# Patient Record
Sex: FEMALE | Race: BLACK OR AFRICAN AMERICAN | Employment: PART TIME | URBAN - METROPOLITAN AREA
[De-identification: names, ages, dates, MRNs, and addresses within clinical notes are randomized per-mention and may not be internally consistent; named-entity substitution may affect disease eponyms.]

---

## 2017-03-13 ENCOUNTER — OFFICE VISIT (OUTPATIENT)
Dept: FAMILY MEDICINE CLINIC | Age: 52
End: 2017-03-13

## 2017-03-13 VITALS
RESPIRATION RATE: 18 BRPM | SYSTOLIC BLOOD PRESSURE: 124 MMHG | HEIGHT: 67 IN | DIASTOLIC BLOOD PRESSURE: 79 MMHG | WEIGHT: 160 LBS | BODY MASS INDEX: 25.11 KG/M2 | TEMPERATURE: 96.9 F | HEART RATE: 69 BPM

## 2017-03-13 DIAGNOSIS — R05.9 COUGH: Primary | ICD-10-CM

## 2017-03-13 DIAGNOSIS — J30.2 SEASONAL ALLERGIC RHINITIS, UNSPECIFIED ALLERGIC RHINITIS TRIGGER: ICD-10-CM

## 2017-03-13 DIAGNOSIS — M25.512 ACUTE PAIN OF LEFT SHOULDER: ICD-10-CM

## 2017-03-13 RX ORDER — CLOCORTOLONE PIVALATE 0 G/G
CREAM TOPICAL
Qty: 45 G | Refills: 2 | Status: SHIPPED | OUTPATIENT
Start: 2017-03-13 | End: 2018-02-20

## 2017-03-13 RX ORDER — NAPROXEN 500 MG/1
500 TABLET ORAL 2 TIMES DAILY WITH MEALS
Qty: 30 TAB | Refills: 0 | Status: SHIPPED | OUTPATIENT
Start: 2017-03-13 | End: 2018-12-12

## 2017-03-13 RX ORDER — LORATADINE AND PSEUDOEPHEDRINE 10; 240 MG/1; MG/1
TABLET, EXTENDED RELEASE ORAL
Qty: 30 TAB | Refills: 0 | Status: SHIPPED | OUTPATIENT
Start: 2017-03-13 | End: 2018-01-25 | Stop reason: SDUPTHER

## 2017-03-13 RX ORDER — AZITHROMYCIN 250 MG/1
TABLET, FILM COATED ORAL
Qty: 6 TAB | Refills: 0 | Status: SHIPPED | OUTPATIENT
Start: 2017-03-13 | End: 2017-04-27

## 2017-03-13 RX ORDER — BENZONATATE 100 MG/1
100 CAPSULE ORAL
Qty: 15 CAP | Refills: 0 | Status: SHIPPED | OUTPATIENT
Start: 2017-03-13 | End: 2017-03-18

## 2017-03-13 RX ORDER — FLUTICASONE PROPIONATE 50 MCG
1 SPRAY, SUSPENSION (ML) NASAL DAILY
Qty: 1 BOTTLE | Refills: 3 | Status: SHIPPED | OUTPATIENT
Start: 2017-03-13 | End: 2018-08-31 | Stop reason: SDUPTHER

## 2017-03-13 NOTE — PROGRESS NOTES
1. Have you been to the ER, urgent care clinic since your last visit? Hospitalized since your last visit? No    2. Have you seen or consulted any other health care providers outside of the [address] [place] [address] since your last visit? Include any pap smears or colon screening.  No

## 2017-03-13 NOTE — PATIENT INSTRUCTIONS
Shoulder Stretches: Exercises  Your Care Instructions  Here are some examples of exercises for your shoulder. Start each exercise slowly. Ease off the exercise if you start to have pain. Your doctor or physical therapist will tell you when you can start these exercises and which ones will work best for you. How to do the exercises  Note: These exercises should cause you to feel a gentle stretch, but no pain. Shoulder stretch    1.  a doorway and place one arm against the door frame. Your elbow should be a little higher than your shoulder. 2. Relax your shoulders as you lean forward, allowing your chest and shoulder muscles to stretch. You can also turn your body slightly away from your arm to stretch the muscles even more. 3. Hold for 15 to 30 seconds. 4. Repeat 2 to 4 times with each arm. Shoulder and chest stretch    Shoulder and chest stretch  1. While sitting, relax your upper body so you slump slightly in your chair. 2. As you breathe in, straighten your back and open your arms out to the sides. 3. Gently pull your shoulder blades back and downward. 4. Hold for 15 to 30 seconds as your breathe normally. 5. Repeat 2 to 4 times. Overhead stretch    1. Reach up over your head with both arms. 2. Hold for 15 to 30 seconds. 3. Repeat 2 to 4 times. Follow-up care is a key part of your treatment and safety. Be sure to make and go to all appointments, and call your doctor if you are having problems. It's also a good idea to know your test results and keep a list of the medicines you take. Where can you learn more? Go to http://dolores-alhaji.info/. Enter S254 in the search box to learn more about \"Shoulder Stretches: Exercises. \"  Current as of: May 23, 2016  Content Version: 11.1  © 8606-0942 [employer]. Care instructions adapted under license by [employer] (which disclaims liability or warranty for this information).  If you have questions about a medical condition or this instruction, always ask your healthcare professional. [address] any warranty or liability for your use of this information. Shoulder Pain: Care Instructions  Your Care Instructions    You can hurt your shoulder by using it too much during an activity, such as fishing or baseball. It can also happen as part of the everyday wear and tear of getting older. Shoulder injuries can be slow to heal, but your shoulder should get better with time. Your doctor may recommend a sling to rest your shoulder. If you have injured your shoulder, you may need testing and treatment. Follow-up care is a key part of your treatment and safety. Be sure to make and go to all appointments, and call your doctor if you are having problems. It's also a good idea to know your test results and keep a list of the medicines you take. How can you care for yourself at home? · Take pain medicines exactly as directed. ¨ If the doctor gave you a prescription medicine for pain, take it as prescribed. ¨ If you are not taking a prescription pain medicine, ask your doctor if you can take an over-the-counter medicine. ¨ Do not take two or more pain medicines at the same time unless the doctor told you to. Many pain medicines contain acetaminophen, which is Tylenol. Too much acetaminophen (Tylenol) can be harmful. · If your doctor recommends that you wear a sling, use it as directed. Do not take it off before your doctor tells you to. · Put ice or a cold pack on the sore area for 10 to 20 minutes at a time. Put a thin cloth between the ice and your skin. · If there is no swelling, you can put moist heat, a heating pad, or a warm cloth on your shoulder. Some doctors suggest alternating between hot and cold. · Rest your shoulder for a few days. If your doctor recommends it, you can then begin gentle exercise of the shoulder, but do not lift anything heavy. When should you call for help?   Call 911 anytime you think you may need emergency care. For example, call if:  · You have chest pain or pressure. This may occur with:  ¨ Sweating. ¨ Shortness of breath. ¨ Nausea or vomiting. ¨ Pain that spreads from the chest to the neck, jaw, or one or both shoulders or arms. ¨ Dizziness or lightheadedness. ¨ A fast or uneven pulse. After calling 911, chew 1 adult-strength aspirin. Wait for an ambulance. Do not try to drive yourself. · Your arm or hand is cool or pale or changes color. Call your doctor now or seek immediate medical care if:  · You have signs of infection, such as:  ¨ Increased pain, swelling, warmth, or redness in your shoulder. ¨ Red streaks leading from a place on your shoulder. ¨ Pus draining from an area of your shoulder. ¨ Swollen lymph nodes in your neck, armpits, or groin. ¨ A fever. Watch closely for changes in your health, and be sure to contact your doctor if:  · You cannot use your shoulder. · Your shoulder does not get better as expected. Where can you learn more? Go to http://doloresCascade Financial Technology Corpalhaji.info/. Enter W613 in the search box to learn more about \"Shoulder Pain: Care Instructions. \"  Current as of: May 23, 2016  Content Version: 11.1  © 6575-6595 [employer]. Care instructions adapted under license by [employer] (which disclaims liability or warranty for this information). If you have questions about a medical condition or this instruction, always ask your healthcare professional. [place] [address] any warranty or liability for your use of this information. Cough: Care Instructions  Your Care Instructions  A cough is your body's response to something that bothers your throat or airways. Many things can cause a cough. You might cough because of a cold or the flu, bronchitis, or asthma. Smoking, postnasal drip, allergies, and stomach acid that backs up into your throat also can cause coughs.   A cough is a symptom, not a disease. Most coughs stop when the cause, such as a cold, goes away. You can take a few steps at home to cough less and feel better. Follow-up care is a key part of your treatment and safety. Be sure to make and go to all appointments, and call your doctor if you are having problems. It's also a good idea to know your test results and keep a list of the medicines you take. How can you care for yourself at home? · Drink lots of water and other fluids. This helps thin the mucus and soothes a dry or sore throat. Honey or lemon juice in hot water or tea may ease a dry cough. · Take cough medicine as directed by your doctor. · Prop up your head on pillows to help you breathe and ease a dry cough. · Try cough drops to soothe a dry or sore throat. Cough drops don't stop a cough. Medicine-flavored cough drops are no better than candy-flavored drops or hard candy. · Do not smoke. Avoid secondhand smoke. If you need help quitting, talk to your doctor about stop-smoking programs and medicines. These can increase your chances of quitting for good. When should you call for help? Call 911 anytime you think you may need emergency care. For example, call if:  · You have severe trouble breathing. Call your doctor now or seek immediate medical care if:  · You cough up blood. · You have new or worse trouble breathing. · You have a new or higher fever. · You have a new rash. Watch closely for changes in your health, and be sure to contact your doctor if:  · You cough more deeply or more often, especially if you notice more mucus or a change in the color of your mucus. · You have new symptoms, such as a sore throat, an earache, or sinus pain. · You do not get better as expected. Where can you learn more? Go to http://dolores-alhaji.info/. Enter D279 in the search box to learn more about \"Cough: Care Instructions. \"  Current as of: May 27, 2016  Content Version: 11.1  © 7316-4695 [employer]. Care instructions adapted under license by [employer]â„¢ (which disclaims liability or warranty for this information). If you have questions about a medical condition or this instruction, always ask your healthcare professional. [address] any warranty or liability for your use of this information. Managing Your Allergies: Care Instructions  Your Care Instructions  Managing your allergies is an important part of staying healthy. Your doctor will help you find out what may be causing the allergies. Common causes of allergy symptoms are house dust and dust mites, animal dander, mold, and pollen. As soon as you know what triggers your symptoms, try to reduce your exposure to your triggers. This can help prevent allergy symptoms, asthma, and other health problems. Ask your doctor about allergy medicine or immunotherapy. These treatments may help reduce or prevent allergy symptoms. Follow-up care is a key part of your treatment and safety. Be sure to make and go to all appointments, and call your doctor if you are having problems. It's also a good idea to know your test results and keep a list of the medicines you take. How can you care for yourself at home? · If you think that dust or dust mites are causing your allergies:  ¨ Wash sheets, pillowcases, and other bedding every week in hot water. ¨ Use airtight, dust-proof covers for pillows, duvets, and mattresses. Avoid plastic covers, because they tend to tear quickly and do not \"breathe. \" Wash according to the instructions. ¨ Remove extra blankets and pillows that you don't need. ¨ Use blankets that are machine-washable. ¨ Don't use home humidifiers. They can help mites live longer. · Use air-conditioning. Change or clean all filters every month. Keep windows closed. Use high-efficiency air filters. Don't use window or attic fans, which draw dust into the air.   · If you're allergic to pet dander, keep pets outside or, at the very least, out of your bedroom. Old carpet and cloth-covered furniture can hold a lot of animal dander. You may need to replace them. · Look for signs of cockroaches. Use cockroach baits to get rid of them. Then clean your home well. · If you're allergic to mold, don't keep indoor plants, because molds can grow in soil. Get rid of furniture, rugs, and drapes that smell musty. Check for mold in the bathroom. · If you're allergic to pollen, stay inside when pollen counts are high. · Don't smoke or let anyone else smoke in your house. Don't use fireplaces or wood-burning stoves. Avoid paint fumes, perfumes, and other strong odors. When should you call for help? Give an epinephrine shot if:  · You think you are having a severe allergic reaction. · You have symptoms in more than one body area, such as mild nausea and an itchy mouth. After giving an epinephrine shot call 911, even if you feel better. Call 911 if:  · You have symptoms of a severe allergic reaction. These may include:  ¨ Sudden raised, red areas (hives) all over your body. ¨ Swelling of the throat, mouth, lips, or tongue. ¨ Trouble breathing. ¨ Passing out (losing consciousness). Or you may feel very lightheaded or suddenly feel weak, confused, or restless. · You have been given an epinephrine shot, even if you feel better. Call your doctor now or seek immediate medical care if:  · You have symptoms of an allergic reaction, such as:  ¨ A rash or hives (raised, red areas on the skin). ¨ Itching. ¨ Swelling. ¨ Belly pain, nausea, or vomiting. Watch closely for changes in your health, and be sure to contact your doctor if:  · Your allergies get worse. · You need help controlling your allergies. · You have questions about allergy testing. · You do not get better as expected. Where can you learn more? Go to http://dolores-alhaji.info/.   Enter L249 in the search box to learn more about \"Managing Your Allergies: Care Instructions. \"  Current as of: February 12, 2016  Content Version: 11.1  © 4428-0785 [employer]. Care instructions adapted under license by [employer] (which disclaims liability or warranty for this information). If you have questions about a medical condition or this instruction, always ask your healthcare professional. [address] any warranty or liability for your use of this information.

## 2017-03-13 NOTE — PROGRESS NOTES
Chief Complaint   Patient presents with    Cold Symptoms     Requesting a z jusitna    Shoulder Pain     left x 2 days           HPI:     This is a 47year old patient who is here with the above complaints. Complains of cold symptoms x 1 week. Denies fever or chills or generalized body aches. Denies sore throat, ear pain, headache, SOB, CP. States she wants to get rid of her non productive cough. Denies being a smoker. Also complains of mild left shoulder pain since yesterday. Took Ibuprofen OTC and now a little bit better. She works in the [employer] and carries heavy stuff. She is left-handed. Denies hearing or feeling that something popped on her left shoulder. History reviewed. No pertinent past medical history. Social History   Substance Use Topics    Smoking status: Current Some Day Smoker     Types: Cigarettes    Smokeless tobacco: None    Alcohol use Yes     Outpatient Encounter Prescriptions as of 3/13/2017   Medication Sig Dispense Refill    CLARITIN-D 24 HOUR  mg per tablet TAKE 1 TAB BY MOUTH DAILYAS NEEDED. 90 Tab 1    ergocalciferol (ERGOCALCIFEROL) 50,000 unit capsule TAKE 1 CAPSULE BY MOUTH EVERY 7 DAYS 12 Cap 1    dimethicone (MEDERMA PM) 2 % topical cream Apply  to affected area as needed for Other. 48 g 0    clocortolone pivalate (CLODERM) 0.1 % topical cream APPLY TO AFFECTED AREA THREE (3) TIMES DAILY AS NEEDED FOR SKIN IRRITATION OR ITCHING. 45 g 0    diclofenac (VOLTAREN) 1 % gel Apply  to affected area four (4) times daily. To affected knee 4 g 4    Clindamycin-Benzoyl Peroxide 1.2-2.5 % gel Apply to dry clean skin BID 50 g 0    Arm Brace (NEOPRENE WRIST SPLINT SUPPORT) misc Use as needed. 1 Each 0    fluticasone (FLONASE) 50 mcg/actuation nasal spray 1 [place] by Both Nostrils route daily. 1 Bottle 6     No facility-administered encounter medications on file as of 3/13/2017. ROS:    Negative except that mentioned in HPI.     Physical Exam:    Vital Signs: Visit Vitals    /79    Pulse 69    Temp 96.9 °F (36.1 °C) (Oral)    Resp 18    Ht 5' 7\" (1.702 m)    Wt 160 lb (72.6 kg)    BMI 25.06 kg/m2         Constitutional: a, a & o x 3, vital signs stable, non ill-appearing, no acute distress, interacting appropriately  HEENT: Head normocephalic, atraumatic, no conjuctival pallor or erythema, PERRLA, EOMI, ear canals clear with no erythema or excessive cerumen, TM intact and non-bulging, nose clear, nasal turbinates mildly erythematous, no sinus tenderness, pharynx and tonsils with no erythema, no exudates, no tonsillar swelling   Respiratory: symmetrical chest expansion, lungs clear to auscultation bilaterally, good air entry, normal respiratory effort, no wheezes or crackles  CV: capillary refill < 2 sec, normal S1S2, regular rate and rhythm, no murmurs, no carotid bruits, no JVD, pulses palpable, no thrills  Skin: no pallor, warm and dry, no rashes, no bruises,  Lymph: no cervical lymphadenopathy  Musculoskeletal: ROM on left shoulder with no limitation, no swelling, no deformity        Assessment/Plan:    1. Cough    - discussed risk of antibiotic- resistant infection with antibiotic use when not indicated  - Discussed etiology of cough - allergy , viral , bacterial, medication-related, GERD-related  - patient requests for antibiotic . Prescribed antimicrobial at a later date. May fill prescription if not getting any better after 3 days. - azithromycin (ZITHROMAX) 250 mg tablet; Take 2 tablets today, then take 1 tablet daily  Dispense: 6 Tab; Refill: 0 - post-dated  - benzonatate (TESSALON) 100 mg capsule; Take 1 Cap by mouth three (3) times daily as needed for Cough for up to 5 days. Dispense: 15 Cap; Refill: 0      2. Seasonal allergic rhinitis, unspecified allergic rhinitis trigger    - fluticasone (FLONASE) 50 mcg/actuation nasal spray; 1 [place] by Both Nostrils route daily. Indications: ALLERGIC RHINITIS  Dispense: 1 Bottle;  Refill: 3  - loratadine-pseudoephedrine (CLARITIN-D 24 HOUR)  mg per tablet; TAKE 1 TAB BY MOUTH DAILYAS NEEDED. Dispense: 30 Tab; Refill: 0  - environment control    3. Acute pain of left shoulder    - naproxen (NAPROSYN) 500 mg tablet; Take 1 Tab by mouth two (2) times daily (with meals). Indications: Pain  Dispense: 30 Tab; Refill: 0  - Shoulder exercises discussed. Printed copy provided. - avoid overuse of left shoulder      Additional Notes: Discussed today's diagnosis and treatment plans. Medication indications and adverse effects discussed. After Visit Summary: Provided and discussed printed patient instructions. Questions answered. Follow-up Disposition:  Return in about 1 month (around 4/13/2017) for follow-up with Dr. Giovanna Cai for CPE. Dionisio Hatfield, ANP-BC  Adult Medicine  [facility]

## 2017-03-13 NOTE — MR AVS SNAPSHOT
Visit Information Date & Time Provider Department Dept. Phone Encounter #  
 3/13/2017 10:30 AM Ashley Delacruz, [address] [id number] Follow-up Instructions Return in about 1 month (around 4/13/2017) for follow-up with Dr. Kaila Ochoa for CPE. Upcoming Health Maintenance Date Due COLONOSCOPY 1/11/1984 Pneumococcal 19-64 Medium Risk (1 of 1 - PPSV23) 1/11/1985 INFLUENZA AGE 9 TO ADULT 8/1/2016 BREAST CANCER SCRN MAMMOGRAM 6/13/2018 PAP AKA CERVICAL CYTOLOGY 4/6/2019 DTaP/Tdap/Td series (2 - Td) 3/3/2025 Allergies as of 3/13/2017  Review Complete On: 3/13/2017 By: KAMALA Headley No Known Allergies Current Immunizations  Reviewed on 3/13/2017 Name Date Tdap 3/3/2015 Reviewed by KAMALA Headley on 3/13/2017 at 10:30 AM  
You Were Diagnosed With   
  
 Codes Comments Seasonal allergic rhinitis, unspecified allergic rhinitis trigger    -  Primary ICD-10-CM: J30.2 ICD-9-CM: 477.9 Acute pain of left shoulder     ICD-10-CM: M25.512 ICD-9-CM: 719.41 Cough     ICD-10-CM: R05 ICD-9-CM: 356. 2 Vitals BP Pulse Temp Resp Height(growth percentile) Weight(growth percentile) 124/79 69 96.9 °F (36.1 °C) (Oral) 18 5' 7\" (1.702 m) 160 lb (72.6 kg) BMI OB Status Smoking Status 25.06 kg/m2 Hysterectomy Current Some Day Smoker Vitals History BMI and BSA Data Body Mass Index Body Surface Area 25.06 kg/m 2 1.85 m 2 Preferred Pharmacy Pharmacy Name Phone [facility], [address] [place] [address] [phone] Your Updated Medication List  
  
   
This list is accurate as of: 3/13/17 10:54 AM.  Always use your most recent med list.  
  
  
  
  
 Arm Brace Misc Commonly known as:  NEOPRENE WRIST SPLINT SUPPORT Use as needed. azithromycin 250 mg tablet Commonly known as:  Bennetta Plum Take 2 tablets today, then take 1 tablet daily  
  
 benzonatate 100 mg capsule Commonly known as:  TESSALON Take 1 Cap by mouth three (3) times daily as needed for Cough for up to 5 days. Clindamycin-Benzoyl Peroxide 1.2-2.5 % Gel Apply to dry clean skin BID  
  
 clocortolone pivalate 0.1 % topical cream  
Commonly known as:  CLODERM  
APPLY TO AFFECTED AREA THREE (3) TIMES DAILY AS NEEDED FOR SKIN IRRITATION OR ITCHING. diclofenac 1 % Gel Commonly known as:  VOLTAREN Apply  to affected area four (4) times daily. To affected knee  
  
 dimethicone 2 % topical cream  
Commonly known as:  [facility]  
Apply  to affected area as needed for Other.  
  
 ergocalciferol 50,000 unit capsule Commonly known as:  ERGOCALCIFEROL  
TAKE 1 CAPSULE BY MOUTH EVERY 7 DAYS  
  
 fluticasone 50 mcg/actuation nasal spray Commonly known as:  FLONASE  
1 [place] by Both Nostrils route daily. Indications: ALLERGIC RHINITIS  
  
 loratadine-pseudoephedrine  mg per tablet Commonly known as:  CLARITIN-D 24 HOUR  
TAKE 1 TAB BY MOUTH DAILYAS NEEDED. naproxen 500 mg tablet Commonly known as:  NAPROSYN Take 1 Tab by mouth two (2) times daily (with meals). Indications: Pain Prescriptions Sent to Pharmacy Refills  
 fluticasone (FLONASE) 50 mcg/actuation nasal spray 3 [place] by Both Nostrils route daily. Indications: ALLERGIC RHINITIS Class: Normal  
 [facility] [address] Ph #: [phone] Route: Both Nostrils  
 loratadine-pseudoephedrine (CLARITIN-D 24 HOUR)  mg per tablet 0 Sig: TAKE 1 TAB BY MOUTH DAILYAS NEEDED. Class: Normal  
 Pharm[facility]: [facility] [address] [facility] Ph #: [phone]  
 clocortolone pivalate (CLODERM) 0.1 % topical cream 2 Sig: APPLY TO AFFECTED AREA THREE (3) TIMES DAILY AS NEEDED FOR SKIN IRRITATION OR ITCHING.   
 Class: Normal  
 Pharmacy: [facility] Ph #: [phone]  
 azithromycin (ZITHROMAX) 250 mg tablet 0 Sig: Take 2 tablets today, then take 1 tablet daily Class: Normal  
 Pharmacy: [facility] [address] [facility] Ph #: [phone]  
 benzonatate (TESSALON) 100 mg capsule 0 Sig: Take 1 Cap by mouth three (3) times daily as needed for Cough for up to 5 days. Class: Normal  
 Pharmacy: [facility] [address] Ph #: [phone] Route: Oral  
 naproxen (NAPROSYN) 500 mg tablet 0 Sig: Take 1 Tab by mouth two (2) times daily (with meals). Indications: Pain Class: Normal  
 Pharmacy: [facility] [address] Ph #: [phone] Route: Oral  
  
Follow-up Instructions Return in about 1 month (around 4/13/2017) for follow-up with Dr. Jim Taylor for CPE. Patient Instructions Shoulder Stretches: Exercises Your Care Instructions Here are some examples of exercises for your shoulder. Start each exercise slowly. Ease off the exercise if you start to have pain. Your doctor or physical therapist will tell you when you can start these exercises and which ones will work best for you. How to do the exercises Note: These exercises should cause you to feel a gentle stretch, but no pain. Shoulder stretch 1.  a doorway and place one arm against the door frame. Your elbow should be a little higher than your shoulder. 2. Relax your shoulders as you lean forward, allowing your chest and shoulder muscles to stretch. You can also turn your body slightly away from your arm to stretch the muscles even more. 3. Hold for 15 to 30 seconds. 4. Repeat 2 to 4 times with each arm. Shoulder and chest stretch Shoulder and chest stretch 1. While sitting, relax your upper body so you slump slightly in your chair.  
2. As you breathe in, straighten your back and open your arms out to the sides. 
3. Gently pull your shoulder blades back and downward. 4. Hold for 15 to 30 seconds as your breathe normally. 5. Repeat 2 to 4 times. Overhead stretch 1. Reach up over your head with both arms. 2. Hold for 15 to 30 seconds. 3. Repeat 2 to 4 times. Follow-up care is a key part of your treatment and safety. Be sure to make and go to all appointments, and call your doctor if you are having problems. It's also a good idea to know your test results and keep a list of the medicines you take. Where can you learn more? Go to http://dolores-alhaji.info/. Enter S254 in the search box to learn more about \"Shoulder Stretches: Exercises. \" Current as of: May 23, 2016 Content Version: 11.1 © 9056-0105 [employer]. Care instructions adapted under license by [employer] (which disclaims liability or warranty for this information). If you have questions about a medical condition or this instruction, always ask your healthcare professional. [place] [address] any warranty or liability for your use of this information. Shoulder Pain: Care Instructions Your Care Instructions You can hurt your shoulder by using it too much during an activity, such as fishing or baseball. It can also happen as part of the everyday wear and tear of getting older. Shoulder injuries can be slow to heal, but your shoulder should get better with time. Your doctor may recommend a sling to rest your shoulder. If you have injured your shoulder, you may need testing and treatment. Follow-up care is a key part of your treatment and safety. Be sure to make and go to all appointments, and call your doctor if you are having problems. It's also a good idea to know your test results and keep a list of the medicines you take. How can you care for yourself at home? · Take pain medicines exactly as directed.  
¨ If the doctor gave you a prescription medicine for pain, take it as prescribed. ¨ If you are not taking a prescription pain medicine, ask your doctor if you can take an over-the-counter medicine. ¨ Do not take two or more pain medicines at the same time unless the doctor told you to. Many pain medicines contain acetaminophen, which is Tylenol. Too much acetaminophen (Tylenol) can be harmful. · If your doctor recommends that you wear a sling, use it as directed. Do not take it off before your doctor tells you to. · Put ice or a cold pack on the sore area for 10 to 20 minutes at a time. Put a thin cloth between the ice and your skin. · If there is no swelling, you can put moist heat, a heating pad, or a warm cloth on your shoulder. Some doctors suggest alternating between hot and cold. · Rest your shoulder for a few days. If your doctor recommends it, you can then begin gentle exercise of the shoulder, but do not lift anything heavy. When should you call for help? Call 911 anytime you think you may need emergency care. For example, call if: 
· You have chest pain or pressure. This may occur with: ¨ Sweating. ¨ Shortness of breath. ¨ Nausea or vomiting. ¨ Pain that spreads from the chest to the neck, jaw, or one or both shoulders or arms. ¨ Dizziness or lightheadedness. ¨ A fast or uneven pulse. After calling 911, chew 1 adult-strength aspirin. Wait for an ambulance. Do not try to drive yourself. · Your arm or hand is cool or pale or changes color. Call your doctor now or seek immediate medical care if: 
· You have signs of infection, such as: 
¨ Increased pain, swelling, warmth, or redness in your shoulder. ¨ Red streaks leading from a place on your shoulder. ¨ Pus draining from an area of your shoulder. ¨ Swollen lymph nodes in your neck, armpits, or groin. ¨ A fever. Watch closely for changes in your health, and be sure to contact your doctor if: 
· You cannot use your shoulder. · Your shoulder does not get better as expected. Where can you learn more? Go to http://dolores-alhaji.info/. Enter J745 in the search box to learn more about \"Shoulder Pain: Care Instructions. \" Current as of: May 23, 2016 Content Version: 11.1 © 4237-8714 [employer]. Care instructions adapted under license by [employer] (which disclaims liability or warranty for this information). If you have questions about a medical condition or this instruction, always ask your healthcare professional. [address] any warranty or liability for your use of this information. Cough: Care Instructions Your Care Instructions A cough is your body's response to something that bothers your throat or airways. Many things can cause a cough. You might cough because of a cold or the flu, bronchitis, or asthma. Smoking, postnasal drip, allergies, and stomach acid that backs up into your throat also can cause coughs. A cough is a symptom, not a disease. Most coughs stop when the cause, such as a cold, goes away. You can take a few steps at home to cough less and feel better. Follow-up care is a key part of your treatment and safety. Be sure to make and go to all appointments, and call your doctor if you are having problems. It's also a good idea to know your test results and keep a list of the medicines you take. How can you care for yourself at home? · Drink lots of water and other fluids. This helps thin the mucus and soothes a dry or sore throat. Honey or lemon juice in hot water or tea may ease a dry cough. · Take cough medicine as directed by your doctor. · Prop up your head on pillows to help you breathe and ease a dry cough. · Try cough drops to soothe a dry or sore throat. Cough drops don't stop a cough. Medicine-flavored cough drops are no better than candy-flavored drops or hard candy. · Do not smoke. Avoid secondhand smoke.  If you need help quitting, talk to your doctor about stop-smoking programs and medicines. These can increase your chances of quitting for good. When should you call for help? Call 911 anytime you think you may need emergency care. For example, call if: 
· You have severe trouble breathing. Call your doctor now or seek immediate medical care if: 
· You cough up blood. · You have new or worse trouble breathing. · You have a new or higher fever. · You have a new rash. Watch closely for changes in your health, and be sure to contact your doctor if: 
· You cough more deeply or more often, especially if you notice more mucus or a change in the color of your mucus. · You have new symptoms, such as a sore throat, an earache, or sinus pain. · You do not get better as expected. Where can you learn more? Go to http://dolores-alhaji.info/. Enter D279 in the search box to learn more about \"Cough: Care Instructions. \" Current as of: May 27, 2016 Content Version: 11.1 © 5860-5346 [employer]. Care instructions adapted under license by [employer] (which disclaims liability or warranty for this information). If you have questions about a medical condition or this instruction, always ask your healthcare professional. [address] any warranty or liability for your use of this information. Managing Your Allergies: Care Instructions Your Care Instructions Managing your allergies is an important part of staying healthy. Your doctor will help you find out what may be causing the allergies. Common causes of allergy symptoms are house dust and dust mites, animal dander, mold, and pollen. As soon as you know what triggers your symptoms, try to reduce your exposure to your triggers. This can help prevent allergy symptoms, asthma, and other health problems. Ask your doctor about allergy medicine or immunotherapy. These treatments may help reduce or prevent allergy symptoms. Follow-up care is a key part of your treatment and safety. Be sure to make and go to all appointments, and call your doctor if you are having problems. It's also a good idea to know your test results and keep a list of the medicines you take. How can you care for yourself at home? · If you think that dust or dust mites are causing your allergies: 
¨ Wash sheets, pillowcases, and other bedding every week in hot water. ¨ Use airtight, dust-proof covers for pillows, duvets, and mattresses. Avoid plastic covers, because they tend to tear quickly and do not \"breathe. \" Wash according to the instructions. ¨ Remove extra blankets and pillows that you don't need. ¨ Use blankets that are machine-washable. ¨ Don't use home humidifiers. They can help mites live longer. · Use air-conditioning. Change or clean all filters every month. Keep windows closed. Use high-efficiency air filters. Don't use window or attic fans, which draw dust into the air. · If you're allergic to pet dander, keep pets outside or, at the very least, out of your bedroom. Old carpet and cloth-covered furniture can hold a lot of animal dander. You may need to replace them. · Look for signs of cockroaches. Use cockroach baits to get rid of them. Then clean your home well. · If you're allergic to mold, don't keep indoor plants, because molds can grow in soil. Get rid of furniture, rugs, and drapes that smell musty. Check for mold in the bathroom. · If you're allergic to pollen, stay inside when pollen counts are high. · Don't smoke or let anyone else smoke in your house. Don't use fireplaces or wood-burning stoves. Avoid paint fumes, perfumes, and other strong odors. When should you call for help? Give an epinephrine shot if: 
· You think you are having a severe allergic reaction. · You have symptoms in more than one body area, such as mild nausea and an itchy mouth. After giving an epinephrine shot call 911, even if you feel better. Call 911 if: 
· You have symptoms of a severe allergic reaction. These may include: 
¨ Sudden raised, red areas (hives) all over your body. ¨ Swelling of the throat, mouth, lips, or tongue. ¨ Trouble breathing. ¨ Passing out (losing consciousness). Or you may feel very lightheaded or suddenly feel weak, confused, or restless. · You have been given an epinephrine shot, even if you feel better. Call your doctor now or seek immediate medical care if: 
· You have symptoms of an allergic reaction, such as: ¨ A rash or hives (raised, red areas on the skin). ¨ Itching. ¨ Swelling. ¨ Belly pain, nausea, or vomiting. Watch closely for changes in your health, and be sure to contact your doctor if: 
· Your allergies get worse. · You need help controlling your allergies. · You have questions about allergy testing. · You do not get better as expected. Where can you learn more? Go to http://dolores-alhaji.info/. Enter L249 in the search box to learn more about \"Managing Your Allergies: Care Instructions. \" Current as of: February 12, 2016 Content Version: 11.1 © 3633-9391 [employer]. Care instructions adapted under license by [employer] (which disclaims liability or warranty for this information). If you have questions about a medical condition or this instruction, always ask your healthcare professional. [address] any warranty or liability for your use of this information. Introducing [facility]! Dear Heber Valencia: 
Thank you for requesting a [employer] account. Our records indicate that you have previously registered for a [employer] account but its currently inactive. Please call our [employer] support line at [phone]. Additional Information If you have questions, please visit the Frequently Asked Questions section of the [employer] website at [email or web address] Remember, MyChart is NOT to be used for urgent needs. For medical emergencies, dial 911. Now available from your iPhone and Android! Please provide this summary of care documentation to your next provider. Your primary care clinician is listed as Karla Phan. If you have any questions after today's visit, please call [phone].

## 2017-04-27 ENCOUNTER — OFFICE VISIT (OUTPATIENT)
Dept: FAMILY MEDICINE CLINIC | Age: 52
End: 2017-04-27

## 2017-04-27 ENCOUNTER — HOSPITAL ENCOUNTER (OUTPATIENT)
Dept: LAB | Age: 52
Discharge: HOME | End: 2017-04-27
Payer: COMMERCIAL

## 2017-04-27 VITALS
OXYGEN SATURATION: 100 % | HEART RATE: 82 BPM | TEMPERATURE: 95.4 F | WEIGHT: 163 LBS | RESPIRATION RATE: 16 BRPM | DIASTOLIC BLOOD PRESSURE: 83 MMHG | SYSTOLIC BLOOD PRESSURE: 126 MMHG | BODY MASS INDEX: 25.58 KG/M2 | HEIGHT: 67 IN

## 2017-04-27 DIAGNOSIS — L90.5 SCAR OF LOWER LEG: ICD-10-CM

## 2017-04-27 DIAGNOSIS — E55.9 VITAMIN D DEFICIENCY: ICD-10-CM

## 2017-04-27 DIAGNOSIS — M25.512 LEFT SHOULDER PAIN, UNSPECIFIED CHRONICITY: ICD-10-CM

## 2017-04-27 DIAGNOSIS — Z00.00 WELL WOMAN EXAM (NO GYNECOLOGICAL EXAM): ICD-10-CM

## 2017-04-27 DIAGNOSIS — Z00.00 WELL WOMAN EXAM (NO GYNECOLOGICAL EXAM): Primary | ICD-10-CM

## 2017-04-27 LAB
ALBUMIN SERPL BCP-MCNC: 3.8 G/DL (ref 3.4–5)
ALBUMIN/GLOB SERPL: 0.9 {RATIO} (ref 0.8–1.7)
ALP SERPL-CCNC: 82 U/L (ref 45–117)
ALT SERPL-CCNC: 28 U/L (ref 13–56)
ANION GAP BLD CALC-SCNC: 8 MMOL/L (ref 3–18)
AST SERPL W P-5'-P-CCNC: 17 U/L (ref 15–37)
BASOPHILS # BLD AUTO: 0 K/UL (ref 0–0.06)
BASOPHILS # BLD: 0 % (ref 0–2)
BILIRUB SERPL-MCNC: 0.3 MG/DL (ref 0.2–1)
BUN SERPL-MCNC: 11 MG/DL (ref 7–18)
BUN/CREAT SERPL: 17 (ref 12–20)
CALCIUM SERPL-MCNC: 9.1 MG/DL (ref 8.5–10.1)
CHLORIDE SERPL-SCNC: 103 MMOL/L (ref 100–108)
CHOLEST SERPL-MCNC: 178 MG/DL
CO2 SERPL-SCNC: 27 MMOL/L (ref 21–32)
CREAT SERPL-MCNC: 0.66 MG/DL (ref 0.6–1.3)
DIFFERENTIAL METHOD BLD: ABNORMAL
EOSINOPHIL # BLD: 0.1 K/UL (ref 0–0.4)
EOSINOPHIL NFR BLD: 2 % (ref 0–5)
ERYTHROCYTE [DISTWIDTH] IN BLOOD BY AUTOMATED COUNT: 14.5 % (ref 11.6–14.5)
GLOBULIN SER CALC-MCNC: 4.2 G/DL (ref 2–4)
GLUCOSE SERPL-MCNC: 91 MG/DL (ref 74–99)
HCT VFR BLD AUTO: 41 % (ref 35–45)
HDLC SERPL-MCNC: 79 MG/DL (ref 40–60)
HDLC SERPL: 2.3 {RATIO} (ref 0–5)
HGB BLD-MCNC: 13.2 G/DL (ref 12–16)
LDLC SERPL CALC-MCNC: 81.4 MG/DL (ref 0–100)
LIPID PROFILE,FLP: ABNORMAL
LYMPHOCYTES # BLD AUTO: 60 % (ref 21–52)
LYMPHOCYTES # BLD: 2.7 K/UL (ref 0.9–3.6)
MCH RBC QN AUTO: 26.9 PG (ref 24–34)
MCHC RBC AUTO-ENTMCNC: 32.2 G/DL (ref 31–37)
MCV RBC AUTO: 83.5 FL (ref 74–97)
MONOCYTES # BLD: 0.2 K/UL (ref 0.05–1.2)
MONOCYTES NFR BLD AUTO: 4 % (ref 3–10)
NEUTS SEG # BLD: 1.6 K/UL (ref 1.8–8)
NEUTS SEG NFR BLD AUTO: 34 % (ref 40–73)
PLATELET # BLD AUTO: 310 K/UL (ref 135–420)
PMV BLD AUTO: 10.1 FL (ref 9.2–11.8)
POTASSIUM SERPL-SCNC: 4.2 MMOL/L (ref 3.5–5.5)
PROT SERPL-MCNC: 8 G/DL (ref 6.4–8.2)
RBC # BLD AUTO: 4.91 M/UL (ref 4.2–5.3)
SODIUM SERPL-SCNC: 138 MMOL/L (ref 136–145)
TRIGL SERPL-MCNC: 88 MG/DL (ref ?–150)
VLDLC SERPL CALC-MCNC: 17.6 MG/DL
WBC # BLD AUTO: 4.6 K/UL (ref 4.6–13.2)

## 2017-04-27 PROCEDURE — 82306 VITAMIN D 25 HYDROXY: CPT | Performed by: INTERNAL MEDICINE

## 2017-04-27 PROCEDURE — 80061 LIPID PANEL: CPT | Performed by: INTERNAL MEDICINE

## 2017-04-27 PROCEDURE — 80053 COMPREHEN METABOLIC PANEL: CPT | Performed by: INTERNAL MEDICINE

## 2017-04-27 PROCEDURE — 36415 COLL VENOUS BLD VENIPUNCTURE: CPT | Performed by: INTERNAL MEDICINE

## 2017-04-27 PROCEDURE — 85025 COMPLETE CBC W/AUTO DIFF WBC: CPT | Performed by: INTERNAL MEDICINE

## 2017-04-27 RX ORDER — CYCLOBENZAPRINE HCL 10 MG
10 TABLET ORAL
Qty: 30 TAB | Refills: 0 | Status: SHIPPED | OUTPATIENT
Start: 2017-04-27 | End: 2017-08-08 | Stop reason: SDUPTHER

## 2017-04-27 NOTE — PROGRESS NOTES
Chief Complaint   Patient presents with    Physical       Pt is a 47y.o. year old female who presents for her annual wellness visit    Health Maintenance Due   Topic Date Due    COLONOSCOPY  01/11/1984-had it done     Pneumococcal 19-64 Medium Risk (1 of 1 - PPSV23) 01/11/1985    INFLUENZA AGE 9 TO ADULT  08/01/2016-declined     Mammo due in June  Had colonoscopy-polyp seen/results not back yet    Fasting today    Hot flashes recently but declines HRT    Lab Results   Component Value Date/Time    Vitamin D 25-Hydroxy 17.3 04/06/2016 09:59 AM     S/p rx    Left shoulder pain; no trauma but does lift some at her work at [employer] by the scar on her lower leg which she got a year ago with the weed whacker-tried Mederma without any results seen  ROS:    Pt denies: Wt loss, Fever/Chills, HA, Visual changes, Fatigue, Chest pain, SOB, JEROME, Abd pain, N/V/D/C, Blood in stool or urine, Edema. Pertinent positive as above in HPI. All others were negative    Patient Active Problem List   Diagnosis Code    CTS (carpal tunnel syndrome) G56.00    Allergic rhinitis J30.9    Vitamin D deficiency E55.9    Acne L70.9       History reviewed. No pertinent past medical history. Current Outpatient Prescriptions   Medication Sig Dispense Refill    cyclobenzaprine (FLEXERIL) 10 mg tablet Take 1 Tab by mouth three (3) times daily as needed for Muscle Spasm(s). 30 Tab 0    fluticasone (FLONASE) 50 mcg/actuation nasal spray 1 [place] by Both Nostrils route daily. Indications: ALLERGIC RHINITIS 1 Bottle 3    loratadine-pseudoephedrine (CLARITIN-D 24 HOUR)  mg per tablet TAKE 1 TAB BY MOUTH DAILYAS NEEDED. 30 Tab 0    clocortolone pivalate (CLODERM) 0.1 % topical cream APPLY TO AFFECTED AREA THREE (3) TIMES DAILY AS NEEDED FOR SKIN IRRITATION OR ITCHING. 45 g 2    naproxen (NAPROSYN) 500 mg tablet Take 1 Tab by mouth two (2) times daily (with meals).  Indications: Pain 30 Tab 0    diclofenac (VOLTAREN) 1 % gel Apply to affected area four (4) times daily. To affected knee 4 g 4    Clindamycin-Benzoyl Peroxide 1.2-2.5 % gel Apply to dry clean skin BID 50 g 0    Arm Brace (NEOPRENE WRIST SPLINT SUPPORT) misc Use as needed. 1 Each 0       History   Smoking Status    Current Some Day Smoker    Types: Cigarettes   Smokeless Tobacco    Not on file       No Known Allergies    Patient Labs were reviewed: yes      Patient Past Records were reviewed:  yes        Objective:     Vitals:    04/27/17 1127   BP: 126/83   Pulse: 82   Resp: 16   Temp: 95.4 °F (35.2 °C)   TempSrc: Oral   SpO2: 100%   Weight: 163 lb (73.9 kg)   Height: 5' 7\" (1.702 m)     Body mass index is 25.53 kg/(m^2). Exam:   Appearance: alert, well appearing,  oriented to person, place, and time, acyanotic, in no respiratory distress and well hydrated. HEENT:  NC/AT, pink conj, anicteric sclerae  Neck:  No cervical lymphadenopathy, no JVD, no thyromegaly, no carotid bruit  Heart:  RRR without M/R/G  Lungs:  CTAB, no rhonchi, rales, or wheezes with good air exchange   Abdomen:  Non-tender, pos bowel sounds, no hepatosplenomegaly  Ext:  No C/C/E, FROM of the left shoulder but with reproducible pain on the anterior area    Skin: no rash; dark scar on the right lower lateral leg area/no keloid seen  Neuro: no lateralizing signs, CNs II-XII intact    Breast exam: no palpable masses bilaterally, no nipple discharge, no axillary adenopathy, no skin changes    Assessment/ Plan:   Zahra Bradley was seen today for physical.    Diagnoses and all orders for this visit:    Well woman exam (no gynecological exam)-Advised re: monthly self breast exam, dental prophylaxis Q 6 months, regular exercise, yearly eye exam, daily intake of Ca+D; no need for PAP this year  -     CHRISTIANO MAMMO BI SCREENING INCL CAD; Future  -     CBC WITH AUTOMATED DIFF; Future  -     METABOLIC PANEL, COMPREHENSIVE; Future  -     LIPID PANEL;  Future    Vitamin D deficiency-s/p rx; advised to take daily OTC Ca+D  - VITAMIN D, 25 HYDROXY; Future    Left shoulder pain, unspecified chronicity  -     XR SHOULDER LT AP/LAT MIN 2 V; Future  -     cyclobenzaprine (FLEXERIL) 10 mg tablet; Take 1 Tab by mouth three (3) times daily as needed for Muscle Spasm(s). Scar of lower leg  -     REFERRAL TO DERMATOLOGY    Menopausal hot flashes-given a list of OTC meds as well as rx for this as she wants to read up on these first    Follow-up Disposition:  Return in about 1 year (around 4/27/2018) for follow up. I have discussed the diagnosis with the patient and the intended plan as seen in the above orders. The patient has received an After-Visit Summary and questions were answered concerning future plans. Medication Side Effects and Warnings were discussed with patient: yes    Patient verbalized understanding of above instructions.     Petra Closs, MD  Internal Medicine  [facility]

## 2017-04-27 NOTE — PATIENT INSTRUCTIONS
Menopause Diet: Care Instructions  Your Care Instructions  Healthy eating helps ease menopause symptoms. And it can reduce your risk for getting conditions such as osteoporosis and heart disease. Follow-up care is a key part of your treatment and safety. Be sure to make and go to all appointments, and call your doctor if you are having problems. It's also a good idea to know your test results and keep a list of the medicines you take. How can you care for yourself at home? · Limit fats in your diet. · Choose foods that have a lot of calcium. The recommended daily intake for adults ages 23 to 48 is 1,000 milligrams (mg). Adults over 50 need 1,200 mg a day. Take a calcium supplement if you don't get enough calcium in the foods you eat. · Add vitamin D to your daily diet. It helps your body use calcium. The recommended daily intake of vitamin D is 600 international units (IU) a day for children and adults up to age 79. Adults age 70 and older need 800 IU a day. Take vitamin D supplements if you need to. · Include good sources of fiber in your diet each day. These include whole grains, beans, fruits, and vegetables. · Avoid simple sugars. This helps if you have mood swings, anxiety, or depression. · Avoid caffeine, or cut back on it. Caffeine can cause sleep problems. It can also make you feel anxious. To relieve these symptoms, pay attention to how much caffeine you are getting in drinks and chocolate. · Limit your intake of alcohol. Heavy drinking tends to make symptoms worse. Where can you learn more? Go to http://dolores-alhaji.info/. Enter C491 in the search box to learn more about \"Menopause Diet: Care Instructions. \"  Current as of: July 26, 2016  Content Version: 11.2  © 2421-0247 [employer]. Care instructions adapted under license by [employer] (which disclaims liability or warranty for this information).  If you have questions about a medical condition or this instruction, always ask your healthcare professional. [place] [address] any warranty or liability for your use of this information. Learning About Menopause  What is menopause? For most women, menopause is a natural process of aging. Menstrual periods gradually stop. The ability to become pregnant ends. Some women feel relief that their childbearing years are ending. But other women struggle with the physical and emotional changes that come with menopause. For most women, menopause happens around age 48. But every woman's body has its own timeline. Some women stop having periods in their mid-40s. Others keep having periods well into their 50s. And some women go through menopause early because of cancer treatment or surgery to remove the ovaries. What can you expect with menopause? · It starts with perimenopause. This is the process of change that leads up to menopause. Perimenopause can start as early as your late 35s or as late as your early 46s. How long it lasts varies. But it usually lasts from 2 to 8 years. · During this time, your hormone levels will go up and down unevenly (fluctuate). This causes changes in your periods and other symptoms. In time, estrogen and progesterone levels drop enough that the menstrual cycle stops. Going a full year without having a period is usually considered menopause. · Low estrogen levels after menopause speed bone loss. This increases your risk of osteoporosis. Also, your risk of heart disease increases after menopause. · It's normal to have thinner, dryer, wrinkled skin after menopause. The vaginal lining and the lower urinary tract also thin and weaken. This can make it hard to have sex. It can also increase the risk of vaginal and urinary tract infections. What are the symptoms? · Lighter or heavier periods. Your menstrual cycle may be longer or shorter. You may skip periods. · Hot flashes.  You may have a sudden feeling of intense body heat. You may sweat, and your head, neck, and chest may get red. Along with hot flashes, you may have a heartbeat that's too fast or not regular. You may also feel anxious or grouchy. In rare cases you might feel panic. · Trouble sleeping. · Mood swings or feeling grouchy, depressed, or worried. · Problems with remembering or thinking clearly. · Vaginal dryness. Some women have only a few mild symptoms. Others have severe symptoms that disrupt their sleep and daily lives. Symptoms tend to last or get worse the first year or more after menopause. Over time, hormones even out at low levels. Many symptoms improve or go away. But some women may have symptoms that don't go away. How are menopause symptoms treated? If you have mild symptoms, you may get some relief if you eat healthy foods, exercise, and lower your stress. Some women choose to take medicines if they have severe symptoms that aren't helped by making changes to their lifestyle. Lifestyle changes  · Choose a heart-healthy diet that is low in saturated fat. It should include plenty of fish, fruits, vegetables, beans, and high-fiber grains and breads. Be sure you get enough calcium and vitamin D to help your bones stay strong. Low-fat or nonfat dairy products are a great source of calcium. · Get regular exercise. Exercise can help you manage your weight, keep your heart and bones strong, and lift your mood. · Limit caffeine, alcohol, and stress. These things can make symptoms worse. Limiting them may help you sleep better. · If you smoke, stop. Quitting smoking can reduce hot flashes and long-term health risks. Medicines  If your symptoms are severe, talk with your doctor. You may want to try prescription medicines, such as:  · Low-dose birth control pills before menopause. · Low-dose hormone therapy (HT) after menopause. · Antidepressants. · A medicine called clonidine (Catapres) that is usually used to treat high blood pressure.   All medicines for menopause symptoms have possible risks or side effects. A very small number of women develop serious health problems when taking hormone therapy. Be sure to talk to your doctor about your possible health risks before you start a treatment for menopause symptoms. Other treatments  You can try:  · Breathing exercises. They may help reduce hot flashes and emotional symptoms. · Soy. Some women feel that eating lots of soy helps even out their menopause symptoms. · Yoga or biofeedback. They may help reduce stress. Follow-up care is a key part of your treatment and safety. Be sure to make and go to all appointments, and call your doctor if you are having problems. It's also a good idea to know your test results and keep a list of the medicines you take. Where can you learn more? Go to http://dolores-alhaji.info/. Enter H199 in the search box to learn more about \"Learning About Menopause. \"  Current as of: October 13, 2016  Content Version: 11.2  © 0599-4718 [employer]. Care instructions adapted under license by [employer] (which disclaims liability or warranty for this information). If you have questions about a medical condition or this instruction, always ask your healthcare professional. [place] [address] any warranty or liability for your use of this information. Well Visit, Women 48 to 72: Care Instructions  Your Care Instructions  Physical exams can help you stay healthy. Your doctor has checked your overall health and may have suggested ways to take good care of yourself. He or she also may have recommended tests. At home, you can help prevent illness with healthy eating, regular exercise, and other steps. Follow-up care is a key part of your treatment and safety. Be sure to make and go to all appointments, and call your doctor if you are having problems.  It's also a good idea to know your test results and keep a list of the medicines you take. How can you care for yourself at home? · Reach and stay at a healthy weight. This will lower your risk for many problems, such as obesity, diabetes, heart disease, and high blood pressure. · Get at least 30 minutes of exercise on most days of the week. Walking is a good choice. You also may want to do other activities, such as running, swimming, cycling, or playing tennis or team sports. · Do not smoke. Smoking can make health problems worse. If you need help quitting, talk to your doctor about stop-smoking programs and medicines. These can increase your chances of quitting for good. · Protect your skin from too much sun. When you're outdoors from 10 a.m. to 4 p.m., stay in the shade or cover up with clothing and a hat with a wide brim. Wear sunglasses that block UV rays. Even when it's cloudy, put broad-spectrum sunscreen (SPF 30 or higher) on any exposed skin. · See a dentist one or two times a year for checkups and to have your teeth cleaned. · Wear a seat belt in the car. · Limit alcohol to 1 drink a day. Too much alcohol can cause health problems. Follow your doctor's advice about when to have certain tests. These tests can spot problems early. · Cholesterol. Your doctor will tell you how often to have this done based on your age, family history, or other things that can increase your risk for heart attack and stroke. · Blood pressure. Have your blood pressure checked during a routine doctor visit. Your doctor will tell you how often to check your blood pressure based on your age, your blood pressure results, and other factors. · Mammogram. Ask your doctor how often you should have a mammogram, which is an X-ray of your breasts. A mammogram can spot breast cancer before it can be felt and when it is easiest to treat. · Pap test and pelvic exam. Ask your doctor how often you should have a Pap test. You may not need to have a Pap test as often as you used to. · Vision.  Have your eyes checked every year or two or as often as your doctor suggests. Some experts recommend that you have yearly exams for glaucoma and other age-related eye problems starting at age 48. · Hearing. Tell your doctor if you notice any change in your hearing. You can have tests to find out how well you hear. · Diabetes. Ask your doctor whether you should have tests for diabetes. · Colon cancer. You should begin tests for colon cancer at age 48. You may have one of several tests. Your doctor will tell you how often to have tests based on your age and risk. Risks include whether you already had a precancerous polyp removed from your colon or whether your parents, sisters and brothers, or children have had colon cancer. · Thyroid disease. Talk to your doctor about whether to have your thyroid checked as part of a regular physical exam. Women have an increased chance of a thyroid problem. · Osteoporosis. You should begin tests for bone density at age 72. If you are younger than 72, ask your doctor whether you have factors that may increase your risk for this disease. You may want to have this test before age 72. · Heart attack and stroke risk. At least every 4 to 6 years, you should have your risk for heart attack and stroke assessed. Your doctor uses factors such as your age, blood pressure, cholesterol, and whether you smoke or have diabetes to show what your risk for a heart attack or stroke is over the next 10 years. When should you call for help? Watch closely for changes in your health, and be sure to contact your doctor if you have any problems or symptoms that concern you. Where can you learn more? Go to http://dolores-alhaji.info/. Enter C066 in the search box to learn more about \"Well Visit, Women 50 to 72: Care Instructions. \"  Current as of: July 19, 2016  Content Version: 11.2  © 8009-1342 [employer].  Care instructions adapted under license by [employer] (which disclaims liability or warranty for this information). If you have questions about a medical condition or this instruction, always ask your healthcare professional. [address] any warranty or liability for your use of this information.

## 2017-04-27 NOTE — MR AVS SNAPSHOT
Visit Information Date & Time Provider Department Dept. Phone Encounter #  
 4/27/2017 11:00 AM Yaz Reno MD [facility] [id number] Follow-up Instructions Return in about 1 year (around 4/27/2018) for follow up. Upcoming Health Maintenance Date Due COLONOSCOPY 1/11/1984 Pneumococcal 19-64 Medium Risk (1 of 1 - PPSV23) 1/11/1985 INFLUENZA AGE 9 TO ADULT 8/1/2016 BREAST CANCER SCRN MAMMOGRAM 6/13/2018 PAP AKA CERVICAL CYTOLOGY 4/6/2019 DTaP/Tdap/Td series (2 - Td) 3/3/2025 Allergies as of 4/27/2017  Review Complete On: 4/27/2017 By: Yaz Reno MD  
 No Known Allergies Current Immunizations  Reviewed on 3/13/2017 Name Date Tdap 3/3/2015 Not reviewed this visit You Were Diagnosed With   
  
 Codes Comments Well woman exam (no gynecological exam)    -  Primary ICD-10-CM: Z00.00 ICD-9-CM: V70.0 Vitamin D deficiency     ICD-10-CM: E55.9 ICD-9-CM: 268.9 Left shoulder pain, unspecified chronicity     ICD-10-CM: M25.512 ICD-9-CM: 719.41 Scar of lower leg     ICD-10-CM: L90.5 ICD-9-CM: 709.2 Vitals BP Pulse Temp Resp Height(growth percentile) Weight(growth percentile) 126/83 82 95.4 °F (35.2 °C) (Oral) 16 5' 7\" (1.702 m) 163 lb (73.9 kg) SpO2 BMI OB Status Smoking Status 100% 25.53 kg/m2 Hysterectomy Current Some Day Smoker Vitals History BMI and BSA Data Body Mass Index Body Surface Area 25.53 kg/m 2 1.87 m 2 Preferred Pharmacy Pharmacy Name Phone [id number], [address] [place] [phone] Your Updated Medication List  
  
   
This list is accurate as of: 4/27/17 12:16 PM.  Always use your most recent med list.  
  
  
  
  
 Arm Brace Misc Commonly known as:  NEOPRENE WRIST SPLINT SUPPORT Use as needed. Clindamycin-Benzoyl Peroxide 1.2-2.5 % Gel Apply to dry clean skin BID  
  
 clocortolone pivalate 0.1 % topical cream  
Commonly known as:  CLODERM  
APPLY TO AFFECTED AREA THREE (3) TIMES DAILY AS NEEDED FOR SKIN IRRITATION OR ITCHING. diclofenac 1 % Gel Commonly known as:  VOLTAREN Apply  to affected area four (4) times daily. To affected knee  
  
 fluticasone 50 mcg/actuation nasal spray Commonly known as:  FLONASE  
1 [place] by Both Nostrils route daily. Indications: ALLERGIC RHINITIS  
  
 loratadine-pseudoephedrine  mg per tablet Commonly known as:  CLARITIN-D 24 HOUR  
TAKE 1 TAB BY MOUTH DAILYAS NEEDED. naproxen 500 mg tablet Commonly known as:  NAPROSYN Take 1 Tab by mouth two (2) times daily (with meals). Indications: Pain We Performed the Following REFERRAL TO DERMATOLOGY [REF19 Custom] Comments:  
 Please evaluate patient for scarring on the right lower leg Follow-up Instructions Return in about 1 year (around 4/27/2018) for follow up. To-Do List   
 04/27/2017 Lab:  CBC WITH AUTOMATED DIFF   
  
 04/27/2017 Lab:  LIPID PANEL   
  
 04/27/2017 Lab:  METABOLIC PANEL, COMPREHENSIVE   
  
 04/27/2017 Lab:  VITAMIN D, 25 HYDROXY   
  
 06/26/2017 Imaging:  [facility] MAMMO BI SCREENING INCL CAD Referral Information Referral ID Referred By Referred To  
  
 [id number] Andrey Cerna Not Available Visits Status Start Date End Date 1 New Request 4/27/17 4/27/18 If your referral has a status of pending review or denied, additional information will be sent to support the outcome of this decision. Patient Instructions Menopause Diet: Care Instructions Your Care Instructions Healthy eating helps ease menopause symptoms. And it can reduce your risk for getting conditions such as osteoporosis and heart disease. Follow-up care is a key part of your treatment and safety.  Be sure to make and go to all appointments, and call your doctor if you are having problems. It's also a good idea to know your test results and keep a list of the medicines you take. How can you care for yourself at home? · Limit fats in your diet. · Choose foods that have a lot of calcium. The recommended daily intake for adults ages 23 to 48 is 1,000 milligrams (mg). Adults over 50 need 1,200 mg a day. Take a calcium supplement if you don't get enough calcium in the foods you eat. · Add vitamin D to your daily diet. It helps your body use calcium. The recommended daily intake of vitamin D is 600 international units (IU) a day for children and adults up to age 79. Adults age 70 and older need 800 IU a day. Take vitamin D supplements if you need to. · Include good sources of fiber in your diet each day. These include whole grains, beans, fruits, and vegetables. · Avoid simple sugars. This helps if you have mood swings, anxiety, or depression. · Avoid caffeine, or cut back on it. Caffeine can cause sleep problems. It can also make you feel anxious. To relieve these symptoms, pay attention to how much caffeine you are getting in drinks and chocolate. · Limit your intake of alcohol. Heavy drinking tends to make symptoms worse. Where can you learn more? Go to http://dolores-alhaji.info/. Enter C912 in the search box to learn more about \"Menopause Diet: Care Instructions. \" Current as of: July 26, 2016 Content Version: 11.2 © 5832-2072 [employer]. Care instructions adapted under license by [employer] (which disclaims liability or warranty for this information). If you have questions about a medical condition or this instruction, always ask your healthcare professional. [place] [address] any warranty or liability for your use of this information. Learning About Menopause What is menopause? For most women, menopause is a natural process of aging.  Menstrual periods gradually stop. The ability to become pregnant ends. Some women feel relief that their childbearing years are ending. But other women struggle with the physical and emotional changes that come with menopause. For most women, menopause happens around age 48. But every woman's body has its own timeline. Some women stop having periods in their mid-40s. Others keep having periods well into their 50s. And some women go through menopause early because of cancer treatment or surgery to remove the ovaries. What can you expect with menopause? · It starts with perimenopause. This is the process of change that leads up to menopause. Perimenopause can start as early as your late 35s or as late as your early 46s. How long it lasts varies. But it usually lasts from 2 to 8 years. · During this time, your hormone levels will go up and down unevenly (fluctuate). This causes changes in your periods and other symptoms. In time, estrogen and progesterone levels drop enough that the menstrual cycle stops. Going a full year without having a period is usually considered menopause. · Low estrogen levels after menopause speed bone loss. This increases your risk of osteoporosis. Also, your risk of heart disease increases after menopause. · It's normal to have thinner, dryer, wrinkled skin after menopause. The vaginal lining and the lower urinary tract also thin and weaken. This can make it hard to have sex. It can also increase the risk of vaginal and urinary tract infections. What are the symptoms? · Lighter or heavier periods. Your menstrual cycle may be longer or shorter. You may skip periods. · Hot flashes. You may have a sudden feeling of intense body heat. You may sweat, and your head, neck, and chest may get red. Along with hot flashes, you may have a heartbeat that's too fast or not regular. You may also feel anxious or grouchy. In rare cases you might feel panic. · Trouble sleeping. · Mood swings or feeling grouchy, depressed, or worried. · Problems with remembering or thinking clearly. · Vaginal dryness. Some women have only a few mild symptoms. Others have severe symptoms that disrupt their sleep and daily lives. Symptoms tend to last or get worse the first year or more after menopause. Over time, hormones even out at low levels. Many symptoms improve or go away. But some women may have symptoms that don't go away. How are menopause symptoms treated? If you have mild symptoms, you may get some relief if you eat healthy foods, exercise, and lower your stress. Some women choose to take medicines if they have severe symptoms that aren't helped by making changes to their lifestyle. Lifestyle changes · Choose a heart-healthy diet that is low in saturated fat. It should include plenty of fish, fruits, vegetables, beans, and high-fiber grains and breads. Be sure you get enough calcium and vitamin D to help your bones stay strong. Low-fat or nonfat dairy products are a great source of calcium. · Get regular exercise. Exercise can help you manage your weight, keep your heart and bones strong, and lift your mood. · Limit caffeine, alcohol, and stress. These things can make symptoms worse. Limiting them may help you sleep better. · If you smoke, stop. Quitting smoking can reduce hot flashes and long-term health risks. Medicines If your symptoms are severe, talk with your doctor. You may want to try prescription medicines, such as: 
· Low-dose birth control pills before menopause. · Low-dose hormone therapy (HT) after menopause. · Antidepressants. · A medicine called clonidine (Catapres) that is usually used to treat high blood pressure. All medicines for menopause symptoms have possible risks or side effects. A very small number of women develop serious health problems when taking hormone therapy.  Be sure to talk to your doctor about your possible health risks before you start a treatment for menopause symptoms. Other treatments You can try: · Breathing exercises. They may help reduce hot flashes and emotional symptoms. · Soy. Some women feel that eating lots of soy helps even out their menopause symptoms. · Yoga or biofeedback. They may help reduce stress. Follow-up care is a key part of your treatment and safety. Be sure to make and go to all appointments, and call your doctor if you are having problems. It's also a good idea to know your test results and keep a list of the medicines you take. Where can you learn more? Go to http://dolores-alhaji.info/. Enter H199 in the search box to learn more about \"Learning About Menopause. \" Current as of: October 13, 2016 Content Version: 11.2 © 4238-2447 [employer]. Care instructions adapted under license by "[employer] (which disclaims liability or warranty for this information). If you have questions about a medical condition or this instruction, always ask your healthcare professional. [place] [address] any warranty or liability for your use of this information. Well Visit, Women 48 to 72: Care Instructions Your Care Instructions Physical exams can help you stay healthy. Your doctor has checked your overall health and may have suggested ways to take good care of yourself. He or she also may have recommended tests. At home, you can help prevent illness with healthy eating, regular exercise, and other steps. Follow-up care is a key part of your treatment and safety. Be sure to make and go to all appointments, and call your doctor if you are having problems. It's also a good idea to know your test results and keep a list of the medicines you take. How can you care for yourself at home? · Reach and stay at a healthy weight. This will lower your risk for many problems, such as obesity, diabetes, heart disease, and high blood pressure. · Get at least 30 minutes of exercise on most days of the week. Walking is a good choice. You also may want to do other activities, such as running, swimming, cycling, or playing tennis or team sports. · Do not smoke. Smoking can make health problems worse. If you need help quitting, talk to your doctor about stop-smoking programs and medicines. These can increase your chances of quitting for good. · Protect your skin from too much sun. When you're outdoors from 10 a.m. to 4 p.m., stay in the shade or cover up with clothing and a hat with a wide brim. Wear sunglasses that block UV rays. Even when it's cloudy, put broad-spectrum sunscreen (SPF 30 or higher) on any exposed skin. · See a dentist one or two times a year for checkups and to have your teeth cleaned. · Wear a seat belt in the car. · Limit alcohol to 1 drink a day. Too much alcohol can cause health problems. Follow your doctor's advice about when to have certain tests. These tests can spot problems early. · Cholesterol. Your doctor will tell you how often to have this done based on your age, family history, or other things that can increase your risk for heart attack and stroke. · Blood pressure. Have your blood pressure checked during a routine doctor visit. Your doctor will tell you how often to check your blood pressure based on your age, your blood pressure results, and other factors. · Mammogram. Ask your doctor how often you should have a mammogram, which is an X-ray of your breasts. A mammogram can spot breast cancer before it can be felt and when it is easiest to treat. · Pap test and pelvic exam. Ask your doctor how often you should have a Pap test. You may not need to have a Pap test as often as you used to. · Vision. Have your eyes checked every year or two or as often as your doctor suggests. Some experts recommend that you have yearly exams for glaucoma and other age-related eye problems starting at age 48. · Hearing. Tell your doctor if you notice any change in your hearing. You can have tests to find out how well you hear. · Diabetes. Ask your doctor whether you should have tests for diabetes. · Colon cancer. You should begin tests for colon cancer at age 48. You may have one of several tests. Your doctor will tell you how often to have tests based on your age and risk. Risks include whether you already had a precancerous polyp removed from your colon or whether your parents, sisters and brothers, or children have had colon cancer. · Thyroid disease. Talk to your doctor about whether to have your thyroid checked as part of a regular physical exam. Women have an increased chance of a thyroid problem. · Osteoporosis. You should begin tests for bone density at age 72. If you are younger than 72, ask your doctor whether you have factors that may increase your risk for this disease. You may want to have this test before age 72. · Heart attack and stroke risk. At least every 4 to 6 years, you should have your risk for heart attack and stroke assessed. Your doctor uses factors such as your age, blood pressure, cholesterol, and whether you smoke or have diabetes to show what your risk for a heart attack or stroke is over the next 10 years. When should you call for help? Watch closely for changes in your health, and be sure to contact your doctor if you have any problems or symptoms that concern you. Where can you learn more? Go to http://dolores-alhaji.info/. Enter L181 in the search box to learn more about \"Well Visit, Women 50 to 72: Care Instructions. \" Current as of: July 19, 2016 Content Version: 11.2 © 0502-3237 [employer]. Care instructions adapted under license by [employer] (which disclaims liability or warranty for this information).  If you have questions about a medical condition or this instruction, always ask your healthcare professional. [employer] disclaims any warranty or liability for your use of this information. Introducing [facility]! Dear Renetta Lion: 
Thank you for requesting a [employer] account. Our records indicate that you have previously registered for a [employer] account but its currently inactive. Please call our [employer] support line at [phone]. Additional Information If you have questions, please visit the Frequently Asked Questions section of the [employer] website at [email or web address] Remember, [employer] is NOT to be used for urgent needs. For medical emergencies, dial 911. Now available from your iPhone and Android! Please provide this summary of care documentation to your next provider. Your primary care clinician is listed as Vasiliy Dupont. If you have any questions after today's visit, please call [phone].

## 2017-04-27 NOTE — PROGRESS NOTES
Rosario Uriarte is here today for a physical.     1. Have you been to the ER, urgent care clinic since your last visit? Hospitalized since your last visit? No    2. Have you seen or consulted any other health care providers outside of the [address] [place] [address] since your last visit? Include any pap smears or colon screening.  No

## 2017-04-28 LAB — 25(OH)D3 SERPL-MCNC: 30.3 NG/ML (ref 30–100)

## 2017-05-02 NOTE — PROGRESS NOTES
pls let patient know Vit D now normal so continue with OTC Ca+d daily; blood sugar normal as well as kidney and liver tests  Cholesterol was normal

## 2017-05-08 ENCOUNTER — TELEPHONE (OUTPATIENT)
Dept: FAMILY MEDICINE CLINIC | Age: 52
End: 2017-05-08

## 2017-05-08 NOTE — TELEPHONE ENCOUNTER
Called and spoke with Nell He. Verified two patient identifiers. Informed patient of lab results per provider. Made patient aware that her Vit D level is now normal and PCP would like her to continue with OTC Ca+D daily, informed patient the rest of her labs were normal. Patient verbalized understanding.

## 2017-06-15 DIAGNOSIS — R92.8 ABNORMAL SCREENING MAMMOGRAM: Primary | ICD-10-CM

## 2017-08-08 ENCOUNTER — OFFICE VISIT (OUTPATIENT)
Dept: FAMILY MEDICINE CLINIC | Age: 52
End: 2017-08-08

## 2017-08-08 VITALS
TEMPERATURE: 97.1 F | BODY MASS INDEX: 25.9 KG/M2 | WEIGHT: 165 LBS | HEIGHT: 67 IN | HEART RATE: 66 BPM | DIASTOLIC BLOOD PRESSURE: 74 MMHG | RESPIRATION RATE: 18 BRPM | SYSTOLIC BLOOD PRESSURE: 120 MMHG

## 2017-08-08 DIAGNOSIS — R39.9 LOWER URINARY TRACT SYMPTOMS (LUTS): Primary | ICD-10-CM

## 2017-08-08 DIAGNOSIS — M25.512 LEFT SHOULDER PAIN, UNSPECIFIED CHRONICITY: ICD-10-CM

## 2017-08-08 DIAGNOSIS — M79.644 THUMB PAIN, RIGHT: ICD-10-CM

## 2017-08-08 LAB
BILIRUB UR QL STRIP: NEGATIVE
GLUCOSE UR-MCNC: NEGATIVE MG/DL
KETONES P FAST UR STRIP-MCNC: NEGATIVE MG/DL
PH UR STRIP: 6.5 [PH] (ref 4.6–8)
PROT UR QL STRIP: NEGATIVE MG/DL
SP GR UR STRIP: 1.01 (ref 1–1.03)
UA UROBILINOGEN AMB POC: NORMAL (ref 0.2–1)
URINALYSIS CLARITY POC: CLEAR
URINALYSIS COLOR POC: YELLOW
URINE BLOOD POC: NEGATIVE
URINE LEUKOCYTES POC: NEGATIVE
URINE NITRITES POC: NEGATIVE

## 2017-08-08 RX ORDER — FLUCONAZOLE 150 MG/1
150 TABLET ORAL DAILY
Qty: 1 TAB | Refills: 0 | Status: SHIPPED | OUTPATIENT
Start: 2017-08-08 | End: 2017-08-09

## 2017-08-08 RX ORDER — METRONIDAZOLE 500 MG/1
500 TABLET ORAL 2 TIMES DAILY
Qty: 14 TAB | Refills: 0 | Status: SHIPPED | OUTPATIENT
Start: 2017-08-08 | End: 2017-08-15

## 2017-08-08 RX ORDER — CYCLOBENZAPRINE HCL 10 MG
10 TABLET ORAL
Qty: 30 TAB | Refills: 0 | Status: SHIPPED | OUTPATIENT
Start: 2017-08-08 | End: 2018-12-12 | Stop reason: SDUPTHER

## 2017-08-08 NOTE — PROGRESS NOTES
Chief Complaint   Patient presents with    Other     right thumb pain from fall x 1 month ago    Other     Vaginal irritation x 2 days; used a new soap ; requesting diflucan    Medication Refill     reordered     HPI:    This is a 48 y/o female  patient who presents for the above symptoms. Right  thumb pain s/p fall x 1 month. Thumb is painful and patient have scheduled to see ortho for this. Patient complain of vaginal irritation that have been going on for 2 days. No vaginal discharge, dysuria, abdominal pain, N/V      ROS: pertinent positives as noted in HPI. All others were negative      No past medical history on file. Social History     Social History    Marital status: SINGLE     Spouse name: N/A    Number of children: N/A    Years of education: N/A     Occupational History    Not on file. Social History Main Topics    Smoking status: Current Some Day Smoker     Types: Cigarettes    Smokeless tobacco: Not on file    Alcohol use Yes    Drug use: No    Sexual activity: No     Other Topics Concern    Not on file     Social History Narrative     Current Outpatient Prescriptions   Medication Sig Dispense Refill    cyclobenzaprine (FLEXERIL) 10 mg tablet Take 1 Tab by mouth three (3) times daily as needed for Muscle Spasm(s). 30 Tab 0    fluticasone (FLONASE) 50 mcg/actuation nasal spray 1 [place] by Both Nostrils route daily. Indications: ALLERGIC RHINITIS 1 Bottle 3    loratadine-pseudoephedrine (CLARITIN-D 24 HOUR)  mg per tablet TAKE 1 TAB BY MOUTH DAILYAS NEEDED. 30 Tab 0    clocortolone pivalate (CLODERM) 0.1 % topical cream APPLY TO AFFECTED AREA THREE (3) TIMES DAILY AS NEEDED FOR SKIN IRRITATION OR ITCHING. 45 g 2    naproxen (NAPROSYN) 500 mg tablet Take 1 Tab by mouth two (2) times daily (with meals). Indications: Pain 30 Tab 0    diclofenac (VOLTAREN) 1 % gel Apply  to affected area four (4) times daily.  To affected knee 4 g 4    Clindamycin-Benzoyl Peroxide 1.2-2.5 % gel Apply to dry clean skin BID 50 g 0    Arm Brace (NEOPRENE WRIST SPLINT SUPPORT) misc Use as needed. 1 Each 0     No Known Allergies        Physical Exam:    Vital Signs:   Visit Vitals    /74    Pulse 66    Temp 97.1 °F (36.2 °C) (Oral)    Resp 18    Ht 5' 7\" (1.702 m)    Wt 165 lb (74.8 kg)    BMI 25.84 kg/m2         General: a, a & o x 3, afebrile, , interacting appropriately, in no acute distress  Skin: warm and dry, no rashes , no bruises, no skin lesions  Musculoskeletal: normal ROM on all joints, no swelling or deformity  Psychiatric: a, a & o x 3, appropriate mood and affect, no thought disorder    Assessment/Plan:      ICD-10-CM ICD-9-CM    1. Lower urinary tract symptoms (LUTS) R39.9 788.99 BV+YEAST CULTURE      AMB POC URINALYSIS DIP STICK AUTO W/O MICRO      BV+YEAST CULTURE      metroNIDAZOLE (FLAGYL) 500 mg tablet      fluconazole (DIFLUCAN) 150 mg tablet   2. Left shoulder pain, unspecified chronicity M25.512 719.41 cyclobenzaprine (FLEXERIL) 10 mg tablet   3. Thumb pain, right M79.644 729.5 Take Tylenol for pain as needed    Pt confirm she has scheduled to see her ortho for this. Additional Notes: Discussed today's diagnosis, treatment plans. Discussed medication indications and side effects. After Visit Summary: Provided and discussed printed patient instructions. Answered questions in relation to today's diagnosis.   Follow-up Disposition: as needed        Brianne Devries NP-BC  Family Medicine  [facility]        Orders Placed This Encounter    BV+YEAST CULTURE    BV+YEAST CULTURE    AMB POC URINALYSIS DIP STICK AUTO W/O MICRO    cyclobenzaprine (FLEXERIL) 10 mg tablet    metroNIDAZOLE (FLAGYL) 500 mg tablet    fluconazole (DIFLUCAN) 150 mg tablet

## 2017-08-08 NOTE — PROGRESS NOTES
1. Have you been to the ER, urgent care clinic since your last visit? Hospitalized since your last visit? No    2. Have you seen or consulted any other health care providers outside of the [address] [place] [address] since your last visit? Include any pap smears or colon screening.  No

## 2017-08-08 NOTE — MR AVS SNAPSHOT
Visit Information Date & Time Provider Department Dept. Phone Encounter #  
 8/8/2017 10:00 AM Abdon Hunt [id number] Follow-up Instructions Return if symptoms worsen or fail to improve. Upcoming Health Maintenance Date Due COLONOSCOPY 1/11/1984 Pneumococcal 19-64 Medium Risk (1 of 1 - PPSV23) 1/11/1985 INFLUENZA AGE 9 TO ADULT 8/1/2017 PAP AKA CERVICAL CYTOLOGY 4/6/2019 BREAST CANCER SCRN MAMMOGRAM 6/14/2019 DTaP/Tdap/Td series (2 - Td) 3/3/2025 Allergies as of 8/8/2017  Review Complete On: 8/8/2017 By: Ronen Mast LPN No Known Allergies Current Immunizations  Reviewed on 3/13/2017 Name Date Tdap 3/3/2015 Not reviewed this visit You Were Diagnosed With   
  
 Codes Comments Lower urinary tract symptoms (LUTS)    -  Primary ICD-10-CM: R39.9 ICD-9-CM: 788.99 Left shoulder pain, unspecified chronicity     ICD-10-CM: M25.512 ICD-9-CM: 719.41 Thumb pain, right     ICD-10-CM: B84.487 ICD-9-CM: 729.5 Vitals BP Pulse Temp Resp Height(growth percentile) Weight(growth percentile) 120/74 66 97.1 °F (36.2 °C) (Oral) 18 5' 7\" (1.702 m) 165 lb (74.8 kg) BMI OB Status Smoking Status 25.84 kg/m2 Hysterectomy Current Some Day Smoker Vitals History BMI and BSA Data Body Mass Index Body Surface Area  
 25.84 kg/m 2 1.88 m 2 Preferred Pharmacy Pharmacy Name Phone [facility], [address] [place] [address] [phone] Your Updated Medication List  
  
   
This list is accurate as of: 8/8/17 11:01 AM.  Always use your most recent med list.  
  
  
  
  
 Arm Brace Misc Commonly known as:  NEOPRENE WRIST SPLINT SUPPORT Use as needed. Clindamycin-Benzoyl Peroxide 1.2-2.5 % Gel Apply to dry clean skin BID  
  
 clocortolone pivalate 0.1 % topical cream  
Commonly known as:  CLODERM  
 APPLY TO AFFECTED AREA THREE (3) TIMES DAILY AS NEEDED FOR SKIN IRRITATION OR ITCHING. cyclobenzaprine 10 mg tablet Commonly known as:  FLEXERIL Take 1 Tab by mouth three (3) times daily as needed for Muscle Spasm(s). diclofenac 1 % Gel Commonly known as:  VOLTAREN Apply  to affected area four (4) times daily. To affected knee  
  
 fluticasone 50 mcg/actuation nasal spray Commonly known as:  FLONASE  
1 [place] by Both Nostrils route daily. Indications: ALLERGIC RHINITIS  
  
 loratadine-pseudoephedrine  mg per tablet Commonly known as:  CLARITIN-D 24 HOUR  
TAKE 1 TAB BY MOUTH DAILYAS NEEDED. naproxen 500 mg tablet Commonly known as:  NAPROSYN Take 1 Tab by mouth two (2) times daily (with meals). Indications: Pain Prescriptions Sent to Pharmacy Refills  
 cyclobenzaprine (FLEXERIL) 10 mg tablet 0 Sig: Take 1 Tab by mouth three (3) times daily as needed for Muscle Spasm(s). Class: Normal  
 Pharmacy: [place] [address] [address] Ph #: [phone] Route: Oral  
  
We Performed the Following AMB POC URINALYSIS DIP STICK AUTO W/O MICRO [01968 CPT(R)] Follow-up Instructions Return if symptoms worsen or fail to improve. Patient Instructions Bacterial Vaginosis: Care Instructions Your Care Instructions Bacterial vaginosis is a type of vaginal infection. It is caused by excess growth of certain bacteria that are normally found in the vagina. Symptoms can include itching, swelling, pain when you urinate or have sex, and a gray or yellow discharge with a \"fishy\" odor. It is not considered an infection that is spread through sexual contact. Although symptoms can be annoying and uncomfortable, bacterial vaginosis does not usually cause other health problems. However, if you have it while you are pregnant, it can cause complications. While the infection may go away on its own, most doctors use antibiotics to treat it. You may have been prescribed pills or vaginal cream. With treatment, bacterial vaginosis usually clears up in 5 to 7 days. Follow-up care is a key part of your treatment and safety. Be sure to make and go to all appointments, and call your doctor if you are having problems. It's also a good idea to know your test results and keep a list of the medicines you take. How can you care for yourself at home? · Take your antibiotics as directed. Do not stop taking them just because you feel better. You need to take the full course of antibiotics. · Do not eat or drink anything that contains alcohol if you are taking metronidazole (Flagyl). · Keep using your medicine if you start your period. Use pads instead of tampons while using a vaginal cream or suppository. Tampons can absorb the medicine. · Wear loose cotton clothing. Do not wear nylon and other materials that hold body heat and moisture close to the skin. · Do not scratch. Relieve itching with a cold pack or a cool bath. · Do not wash your vaginal area more than once a day. Use plain water or a mild, unscented soap. Do not douche. When should you call for help? Watch closely for changes in your health, and be sure to contact your doctor if: 
· You have unexpected vaginal bleeding. · You have a fever. · You have new or increased pain in your vagina or pelvis. · You are not getting better after 1 week. · Your symptoms return after you finish the course of your medicine. Where can you learn more? Go to http://dolores-alhaji.info/. Sandra Goodman in the search box to learn more about \"Bacterial Vaginosis: Care Instructions. \" Current as of: October 13, 2016 Content Version: 11.3 © 6727-2724 [employer].  Care instructions adapted under license by [employer] (which disclaims liability or warranty for this information). If you have questions about a medical condition or this instruction, always ask your healthcare professional. [address] any warranty or liability for your use of this information. Vaginal Yeast Infection: Care Instructions Your Care Instructions A vaginal yeast infection is caused by too many yeast cells in the vagina. This is common in women of all ages. Itching, vaginal discharge and irritation, and other symptoms can bother you. But yeast infections don't often cause other health problems. Some medicines can increase your risk of getting a yeast infection. These include antibiotics, birth control pills, hormones, and steroids. You may also be more likely to get a yeast infection if you are pregnant, have diabetes, douche, or wear tight clothes. With treatment, most yeast infections get better in 2 to 3 days. Follow-up care is a key part of your treatment and safety. Be sure to make and go to all appointments, and call your doctor if you are having problems. It's also a good idea to know your test results and keep a list of the medicines you take. How can you care for yourself at home? · Take your medicines exactly as prescribed. Call your doctor if you think you are having a problem with your medicine. · Ask your doctor about over-the-counter (OTC) medicines for yeast infections. They may cost less than prescription medicines. If you use an OTC treatment, read and follow all instructions on the label. · Do not use tampons while using a vaginal cream or suppository. The tampons can absorb the medicine. Use pads instead. · Wear loose cotton clothing. Do not wear nylon or other fabric that holds body heat and moisture close to the skin. · Try sleeping without underwear. · Do not scratch. Relieve itching with a cold pack or a cool bath. · Do not wash your vaginal area more than once a day.  Use plain water or a mild, unscented soap. Air-dry the vaginal area. · Change out of wet swimsuits after swimming. · Do not have sex until you have finished your treatment. · Do not douche. When should you call for help? Call your doctor now or seek immediate medical care if: 
· You have unexpected vaginal bleeding. · You have new or increased pain in your vagina or pelvis. Watch closely for changes in your health, and be sure to contact your doctor if: 
· You have a fever. · You are not getting better after 2 days. · Your symptoms come back after you finish your medicines. Where can you learn more? Go to http://dolores-alhaji.info/. Enter T301 in the search box to learn more about \"Vaginal Yeast Infection: Care Instructions. \" Current as of: October 13, 2016 Content Version: 11.3 © 5101-5409 [employer]. Care instructions adapted under license by [employer] (which disclaims liability or warranty for this information). If you have questions about a medical condition or this instruction, always ask your healthcare professional. [address] any warranty or liability for your use of this information. Introducing [facility]! [facility] introduces VGTel patient portal. Now you can access parts of your medical record, email your doctor's office, and request medication refills online. 1. In your internet browser, go to [email or web address] 2. Click on the First Time User? Click Here link in the Sign In box. You will see the New Member Sign Up page. 3. Enter your VGTel Access Code exactly as it appears below. You will not need to use this code after youve completed the sign-up process. If you do not sign up before the expiration date, you must request a new code. · VGTel Access Code: [email or web address] Expires: 8/12/2017  6:52 PM 
 
4.  Enter the last four digits of your Social Security Number (xxxx) and Date of Birth (mm/dd/yyyy) as indicated and click Submit. You will be taken to the next sign-up page. 5. Create a ClassPass ID. This will be your ClassPass login ID and cannot be changed, so think of one that is secure and easy to remember. 6. Create a ClassPass password. You can change your password at any time. 7. Enter your Password Reset Question and Answer. This can be used at a later time if you forget your password. 8. Enter your e-mail address. You will receive e-mail notification when new information is available in [address] 9. Click Sign Up. You can now view and download portions of your medical record. 10. Click the Download Summary menu link to download a portable copy of your medical information. If you have questions, please visit the Frequently Asked Questions section of the ClassPass website. Remember, ClassPass is NOT to be used for urgent needs. For medical emergencies, dial 911. Now available from your iPhone and Android! Please provide this summary of care documentation to your next provider. Your primary care clinician is listed as Moira Dorman. If you have any questions after today's visit, please call [phone].

## 2017-08-08 NOTE — PATIENT INSTRUCTIONS
Bacterial Vaginosis: Care Instructions  Your Care Instructions    Bacterial vaginosis is a type of vaginal infection. It is caused by excess growth of certain bacteria that are normally found in the vagina. Symptoms can include itching, swelling, pain when you urinate or have sex, and a gray or yellow discharge with a \"fishy\" odor. It is not considered an infection that is spread through sexual contact. Although symptoms can be annoying and uncomfortable, bacterial vaginosis does not usually cause other health problems. However, if you have it while you are pregnant, it can cause complications. While the infection may go away on its own, most doctors use antibiotics to treat it. You may have been prescribed pills or vaginal cream. With treatment, bacterial vaginosis usually clears up in 5 to 7 days. Follow-up care is a key part of your treatment and safety. Be sure to make and go to all appointments, and call your doctor if you are having problems. It's also a good idea to know your test results and keep a list of the medicines you take. How can you care for yourself at home? · Take your antibiotics as directed. Do not stop taking them just because you feel better. You need to take the full course of antibiotics. · Do not eat or drink anything that contains alcohol if you are taking metronidazole (Flagyl). · Keep using your medicine if you start your period. Use pads instead of tampons while using a vaginal cream or suppository. Tampons can absorb the medicine. · Wear loose cotton clothing. Do not wear nylon and other materials that hold body heat and moisture close to the skin. · Do not scratch. Relieve itching with a cold pack or a cool bath. · Do not wash your vaginal area more than once a day. Use plain water or a mild, unscented soap. Do not douche. When should you call for help?   Watch closely for changes in your health, and be sure to contact your doctor if:  · You have unexpected vaginal bleeding. · You have a fever. · You have new or increased pain in your vagina or pelvis. · You are not getting better after 1 week. · Your symptoms return after you finish the course of your medicine. Where can you learn more? Go to http://dolores-alhaji.info/. Sathish Gonzálesl in the search box to learn more about \"Bacterial Vaginosis: Care Instructions. \"  Current as of: October 13, 2016  Content Version: 11.3  © 2627-4439 [employer]. Care instructions adapted under license by [employer] (which disclaims liability or warranty for this information). If you have questions about a medical condition or this instruction, always ask your healthcare professional. [address] any warranty or liability for your use of this information. Vaginal Yeast Infection: Care Instructions  Your Care Instructions  A vaginal yeast infection is caused by too many yeast cells in the vagina. This is common in women of all ages. Itching, vaginal discharge and irritation, and other symptoms can bother you. But yeast infections don't often cause other health problems. Some medicines can increase your risk of getting a yeast infection. These include antibiotics, birth control pills, hormones, and steroids. You may also be more likely to get a yeast infection if you are pregnant, have diabetes, douche, or wear tight clothes. With treatment, most yeast infections get better in 2 to 3 days. Follow-up care is a key part of your treatment and safety. Be sure to make and go to all appointments, and call your doctor if you are having problems. It's also a good idea to know your test results and keep a list of the medicines you take. How can you care for yourself at home? · Take your medicines exactly as prescribed. Call your doctor if you think you are having a problem with your medicine. · Ask your doctor about over-the-counter (OTC) medicines for yeast infections.  They may cost less than prescription medicines. If you use an OTC treatment, read and follow all instructions on the label. · Do not use tampons while using a vaginal cream or suppository. The tampons can absorb the medicine. Use pads instead. · Wear loose cotton clothing. Do not wear nylon or other fabric that holds body heat and moisture close to the skin. · Try sleeping without underwear. · Do not scratch. Relieve itching with a cold pack or a cool bath. · Do not wash your vaginal area more than once a day. Use plain water or a mild, unscented soap. Air-dry the vaginal area. · Change out of wet swimsuits after swimming. · Do not have sex until you have finished your treatment. · Do not douche. When should you call for help? Call your doctor now or seek immediate medical care if:  · You have unexpected vaginal bleeding. · You have new or increased pain in your vagina or pelvis. Watch closely for changes in your health, and be sure to contact your doctor if:  · You have a fever. · You are not getting better after 2 days. · Your symptoms come back after you finish your medicines. Where can you learn more? Go to http://dolores-alhaji.info/. Enter H360 in the search box to learn more about \"Vaginal Yeast Infection: Care Instructions. \"  Current as of: October 13, 2016  Content Version: 11.3  © 2914-8722 [employer]!. Care instructions adapted under license by [employer] (which disclaims liability or warranty for this information). If you have questions about a medical condition or this instruction, always ask your healthcare professional. [place] [address] any warranty or liability for your use of this information.

## 2017-08-11 LAB
BACTERIAL SIALIDASE SPEC QL: POSITIVE
YEAST GENITAL QL CULT: POSITIVE

## 2017-08-14 DIAGNOSIS — B37.31 YEAST VAGINITIS: Primary | ICD-10-CM

## 2017-08-14 RX ORDER — FLUCONAZOLE 150 MG/1
150 TABLET ORAL DAILY
Qty: 1 TAB | Refills: 0 | Status: SHIPPED | OUTPATIENT
Start: 2017-08-14 | End: 2017-08-15

## 2017-09-18 ENCOUNTER — TELEPHONE (OUTPATIENT)
Dept: FAMILY MEDICINE CLINIC | Age: 52
End: 2017-09-18

## 2017-09-18 RX ORDER — FLUCONAZOLE 150 MG/1
150 TABLET ORAL DAILY
Qty: 1 TAB | Refills: 0 | Status: SHIPPED | OUTPATIENT
Start: 2017-09-18 | End: 2017-09-19

## 2017-09-18 NOTE — TELEPHONE ENCOUNTER
Patient is requesting a refill off medication, she does not know the name of, however she attempted to cleat her symptom with another product that did not work,  Please call her back as she is hesitant in providing additional information.

## 2018-01-25 RX ORDER — LORATADINE AND PSEUDOEPHEDRINE 10; 240 MG/1; MG/1
TABLET, EXTENDED RELEASE ORAL
Qty: 30 TAB | Refills: 0 | Status: SHIPPED | OUTPATIENT
Start: 2018-01-25 | End: 2018-02-20 | Stop reason: SDUPTHER

## 2018-02-05 DIAGNOSIS — R92.8 ABNORMAL SCREENING MAMMOGRAM: Primary | ICD-10-CM

## 2018-02-16 ENCOUNTER — TELEPHONE (OUTPATIENT)
Dept: FAMILY MEDICINE CLINIC | Age: 53
End: 2018-02-16

## 2018-02-16 NOTE — TELEPHONE ENCOUNTER
Patient is requesting order to have both breasts completed by mammogram. She did have the left completed.

## 2018-02-19 NOTE — TELEPHONE ENCOUNTER
Patient notified that mammogram is not due until June. Patient states that she has been experiencing lizbeth discomfort. I recommended for patient to schedule a follow up appointment to be examined.

## 2018-02-20 ENCOUNTER — OFFICE VISIT (OUTPATIENT)
Dept: FAMILY MEDICINE CLINIC | Age: 53
End: 2018-02-20

## 2018-02-20 VITALS
DIASTOLIC BLOOD PRESSURE: 75 MMHG | BODY MASS INDEX: 25.9 KG/M2 | HEIGHT: 67 IN | RESPIRATION RATE: 17 BRPM | HEART RATE: 75 BPM | WEIGHT: 165 LBS | OXYGEN SATURATION: 100 % | SYSTOLIC BLOOD PRESSURE: 121 MMHG | TEMPERATURE: 97 F

## 2018-02-20 DIAGNOSIS — E66.3 OVERWEIGHT (BMI 25.0-29.9): ICD-10-CM

## 2018-02-20 DIAGNOSIS — G56.03 BILATERAL CARPAL TUNNEL SYNDROME: ICD-10-CM

## 2018-02-20 DIAGNOSIS — N64.4 BREAST PAIN: Primary | ICD-10-CM

## 2018-02-20 DIAGNOSIS — J30.9 CHRONIC ALLERGIC RHINITIS, UNSPECIFIED SEASONALITY, UNSPECIFIED TRIGGER: ICD-10-CM

## 2018-02-20 DIAGNOSIS — L70.9 ACNE, UNSPECIFIED ACNE TYPE: ICD-10-CM

## 2018-02-20 DIAGNOSIS — Z80.3 FAMILY HISTORY OF BREAST CANCER IN SISTER: ICD-10-CM

## 2018-02-20 DIAGNOSIS — M19.171 POST-TRAUMATIC ARTHRITIS OF RIGHT ANKLE: ICD-10-CM

## 2018-02-20 RX ORDER — BETAMETHASONE DIPROPIONATE 0.5 MG/G
CREAM TOPICAL 2 TIMES DAILY
Qty: 45 G | Refills: 1 | Status: SHIPPED | OUTPATIENT
Start: 2018-02-20 | End: 2018-10-15 | Stop reason: ALTCHOICE

## 2018-02-20 RX ORDER — LORATADINE AND PSEUDOEPHEDRINE 10; 240 MG/1; MG/1
TABLET, EXTENDED RELEASE ORAL
Qty: 90 TAB | Refills: 2 | Status: SHIPPED | OUTPATIENT
Start: 2018-02-20 | End: 2018-08-31

## 2018-02-20 RX ORDER — CLOCORTOLONE PIVALATE 0 G/G
CREAM TOPICAL
Qty: 45 G | Refills: 2 | Status: CANCELLED | OUTPATIENT
Start: 2018-02-20

## 2018-02-20 NOTE — PATIENT INSTRUCTIONS
Breast Pain: Care Instructions  Your Care Instructions    Breast tenderness and pain may come and go with your monthly periods (cyclic), or it may not follow any pattern (noncyclic). Breast pain is rarely caused by a serious health problem. You may need tests to find the cause. Follow-up care is a key part of your treatment and safety. Be sure to make and go to all appointments, and call your doctor if you are having problems. It's also a good idea to know your test results and keep a list of the medicines you take. How can you care for yourself at home? · If your doctor gave you medicine, take it exactly as prescribed. Call your doctor if you think you are having a problem with your medicine. · Take an over-the-counter pain medicine, such as acetaminophen (Tylenol), ibuprofen (Advil, Motrin), or naproxen (Aleve), to relieve pain and swelling. Read and follow all instructions on the label. · Do not take two or more pain medicines at the same time unless the doctor told you to. Many pain medicines have acetaminophen, which is Tylenol. Too much acetaminophen (Tylenol) can be harmful. · Wear a supportive bra, such as a sports bra or a jog bra. · Cut down on the amount of fat in your diet. If you need help planning healthy meals, see a dietitian. · Get at least 30 minutes of exercise on most days of the week. Walking is a good choice. You also may want to do other activities, such as running, swimming, cycling, or playing tennis or team sports. · Keep a healthy sleep pattern. Go to bed at the same time every night, and get up at the same time every day. When should you call for help? Call your doctor now or seek immediate medical care if:  ? · You have new changes in a breast, such as:  ¨ A lump or thickening in your breast or armpit. ¨ A change in the breast's size or shape. ¨ Skin changes, such as dimples or puckers. ¨ Nipple discharge.   ¨ A change in the color or feel of the skin of your breast or the darker area around the nipple (areola). ¨ A change in the shape of the nipple (it may look like it's being pulled into the breast). ? · You have symptoms of a breast infection, such as:  ¨ Increased pain, swelling, redness, or warmth around a breast.  ¨ Red streaks extending from the breast.  ¨ Pus draining from a breast.  ¨ A fever. ? Watch closely for changes in your health, and be sure to contact your doctor if:  ? · Your breast pain does not get better after 1 week. ? · You have a lump or thickening in your breast or armpit. Where can you learn more? Go to http://dolores-alhaji.info/. Enter M523 in the search box to learn more about \"Breast Pain: Care Instructions. \"  Current as of: March 20, 2017  Content Version: 11.4  © 9922-0516 [employer]. Care instructions adapted under license by [employer] (which disclaims liability or warranty for this information). If you have questions about a medical condition or this instruction, always ask your healthcare professional. [address] any warranty or liability for your use of this information.

## 2018-02-20 NOTE — PROGRESS NOTES
Chief Complaint   Patient presents with    Breast Problem     Patient would like mammogram/ultrasound    Medication Refill     hand braces, skin cream, flexeril       Pt is a 47y.o. year old female who presents for follow up of her chronic medical problems    Sister with breast Ca-BRCA neg; in her late 40's    June 2017  IMPRESSION: [address]  The area of fibroglandular tissue in the left breast is probably benign. A follow-up left mammogram and an ultrasound in 6 months is recommended to   demonstrate stability. ? pain on breasts; feels funny on the right axillary area    Needs refill of Claritin D brand name; generic not working for her allergy sxs    Cloderm for back itching-needs cheaper alternative    Left thumb pain  Saw hand doc for CTS-supposed to have surgery but was not ready for it  Needs hand brace-lost both    Needs handicap parking -hx of twisted right ankle-gets swollen    Wt Readings from Last 3 Encounters:   02/20/18 165 lb (74.8 kg)   08/08/17 165 lb (74.8 kg)   04/27/17 163 lb (73.9 kg)   BMI 25    ROS:    Pt denies: Wt loss, Fever/Chills, HA, Visual changes, Fatigue, Chest pain, SOB, JEROME, Abd pain, N/V/D/C, Blood in stool or urine, Edema. Pertinent positive as above in HPI. All others were negative    Patient Active Problem List   Diagnosis Code    CTS (carpal tunnel syndrome) G56.00    Allergic rhinitis J30.9    Vitamin D deficiency E55.9    Acne L70.9    Family history of breast cancer in sister Z80.2       History reviewed. No pertinent past medical history. Current Outpatient Prescriptions   Medication Sig Dispense Refill    loratadine-pseudoephedrine (CLARITIN-D 24 HOUR)  mg per tablet TAKE 1 TAB BY MOUTH DAILYAS NEEDED. 90 Tab 2    betamethasone dipropionate (DIPROSONE) 0.05 % topical cream Apply  to affected area two (2) times a day.  45 g 1    cyclobenzaprine (FLEXERIL) 10 mg tablet Take 1 Tab by mouth three (3) times daily as needed for Muscle Spasm(s). 30 Tab 0    fluticasone (FLONASE) 50 mcg/actuation nasal spray 1 [place] by Both Nostrils route daily. Indications: ALLERGIC RHINITIS 1 Bottle 3    naproxen (NAPROSYN) 500 mg tablet Take 1 Tab by mouth two (2) times daily (with meals). Indications: Pain 30 Tab 0    Clindamycin-Benzoyl Peroxide 1.2-2.5 % gel Apply to dry clean skin BID 50 g 0    Arm Brace (NEOPRENE WRIST SPLINT SUPPORT) misc Use as needed. 1 Each 0    diclofenac (VOLTAREN) 1 % gel Apply  to affected area four (4) times daily. To affected knee 4 g 4       History   Smoking Status    Current Some Day Smoker    Types: Cigarettes   Smokeless Tobacco    Former User       No Known Allergies    Patient Labs were reviewed: yes      Patient Past Records were reviewed:  yes        Objective:     Vitals:    02/20/18 1104   BP: 121/75   Pulse: 75   Resp: 17   Temp: 97 °F (36.1 °C)   TempSrc: Oral   SpO2: 100%   Weight: 165 lb (74.8 kg)   Height: 5' 7\" (1.702 m)     Body mass index is 25.84 kg/(m^2). Exam:   Appearance: alert, well appearing,  oriented to person, place, and time, acyanotic, in no respiratory distress and well hydrated. HEENT:  NC/AT, pink conj, anicteric sclerae  Neck:  No cervical lymphadenopathy, no JVD, no thyromegaly, no carotid bruit  Heart:  RRR without M/R/G  Lungs:  CTAB, no rhonchi, rales, or wheezes with good air exchange   Abdomen:  Non-tender, pos bowel sounds, no hepatosplenomegaly  Ext:  No C/C/E, mild swelling right ankle    Skin: no rash  Neuro: no lateralizing signs, CNs II-XII intact  Breast exam: no palpable masses bilaterally, no nipple discharge, no axillary adenopathy, no skin changes    Assessment/ Plan:   Diagnoses and all orders for this visit:    1. Breast pain-will order for her to have diagnostic mammo earlier than 6/2017 when she is due for screening bec of the pain and family hx  -     CHRISTIANO MAMMO BI DX INCL CAD; Future    2.  Family history of breast cancer in sister-advised monthly self breast exam; get 3 D mammograms    3. Acne, unspecified acne type  -     betamethasone dipropionate (DIPROSONE) 0.05 % topical cream; Apply  to affected area two (2) times a day. 4. Post-traumatic arthritis of right ankle-DMV form for handicap parking     5. Bilateral carpal tunnel syndrome  -     AMB SUPPLY ORDER for wrist brace; given exercises to do    6. Chronic allergic rhinitis, unspecified seasonality, unspecified trigger  -     loratadine-pseudoephedrine (CLARITIN-D 24 HOUR)  mg per tablet; TAKE 1 TAB BY MOUTH DAILYAS NEEDED. 7. Overweight-advised re ideal weight for height    Follow-up Disposition:  Return if symptoms worsen or fail to improve. I have discussed the diagnosis with the patient and the intended plan as seen in the above orders. The patient has received an After-Visit Summary and questions were answered concerning future plans. Medication Side Effects and Warnings were discussed with patient: yes    Patient verbalized understanding of above instructions.     Haris Perez MD  Internal Medicine  [facility]

## 2018-02-20 NOTE — PROGRESS NOTES
Chief Complaint   Patient presents with    Breast Problem     Patient would like mammogram/ultrasound    Medication Refill     hand braces, skin cream, flexeril     1. Have you been to the ER, urgent care clinic since your last visit? Hospitalized since your last visit? No    2. Have you seen or consulted any other health care providers outside of the [address] [place] [address] since your last visit? Include any pap smears or colon screening.  No

## 2018-02-22 PROBLEM — E66.3 OVERWEIGHT (BMI 25.0-29.9): Status: ACTIVE | Noted: 2018-02-22

## 2018-08-31 ENCOUNTER — OFFICE VISIT (OUTPATIENT)
Dept: FAMILY MEDICINE CLINIC | Age: 53
End: 2018-08-31

## 2018-08-31 VITALS
BODY MASS INDEX: 25.8 KG/M2 | HEIGHT: 67 IN | HEART RATE: 65 BPM | SYSTOLIC BLOOD PRESSURE: 128 MMHG | RESPIRATION RATE: 17 BRPM | DIASTOLIC BLOOD PRESSURE: 78 MMHG | OXYGEN SATURATION: 98 % | WEIGHT: 164.4 LBS | TEMPERATURE: 98.3 F

## 2018-08-31 DIAGNOSIS — B37.31 YEAST VAGINITIS: ICD-10-CM

## 2018-08-31 DIAGNOSIS — N76.0 BV (BACTERIAL VAGINOSIS): Primary | ICD-10-CM

## 2018-08-31 DIAGNOSIS — B96.89 BV (BACTERIAL VAGINOSIS): Primary | ICD-10-CM

## 2018-08-31 DIAGNOSIS — J30.9 ALLERGIC RHINITIS, UNSPECIFIED SEASONALITY, UNSPECIFIED TRIGGER: ICD-10-CM

## 2018-08-31 RX ORDER — METRONIDAZOLE 500 MG/1
500 TABLET ORAL 2 TIMES DAILY
Qty: 14 TAB | Refills: 0 | Status: SHIPPED | OUTPATIENT
Start: 2018-08-31 | End: 2018-09-07

## 2018-08-31 RX ORDER — FLUCONAZOLE 150 MG/1
150 TABLET ORAL DAILY
Qty: 1 TAB | Refills: 0 | Status: SHIPPED | OUTPATIENT
Start: 2018-08-31 | End: 2018-09-01

## 2018-08-31 RX ORDER — LORATADINE AND PSEUDOEPHEDRINE SULFATE 10; 240 MG/1; MG/1
1 TABLET, EXTENDED RELEASE ORAL DAILY
Qty: 90 TAB | Refills: 1 | Status: SHIPPED | OUTPATIENT
Start: 2018-08-31 | End: 2018-12-12 | Stop reason: SDUPTHER

## 2018-08-31 RX ORDER — LORATADINE 10 MG/1
10 TABLET ORAL DAILY
Qty: 90 TAB | Refills: 1 | Status: SHIPPED | OUTPATIENT
Start: 2018-08-31 | End: 2018-08-31

## 2018-08-31 RX ORDER — FLUTICASONE PROPIONATE 50 MCG
1 SPRAY, SUSPENSION (ML) NASAL DAILY
Qty: 1 BOTTLE | Refills: 3 | Status: SHIPPED | OUTPATIENT
Start: 2018-08-31 | End: 2018-12-12 | Stop reason: SDUPTHER

## 2018-08-31 NOTE — MR AVS SNAPSHOT
[facility] [place] [address] [facility] [address] [facility] [address] [id number] 
[phone] Patient: Christian Hodges 
MRN: [id number] :1965 Visit Information Date & Time Provider Department Dept. Phone Encounter #  
 2018 11:45 AM Dhaval Abreu, [address] [facility] [phone] [id number] Follow-up Instructions Return if symptoms worsen or fail to improve. Upcoming Health Maintenance Date Due Pneumococcal 19-64 Medium Risk (1 of 1 - PPSV23) 1985 Influenza Age 5 to Adult 2018 PAP AKA CERVICAL CYTOLOGY 2019 BREAST CANCER SCRN MAMMOGRAM 2020 DTaP/Tdap/Td series (2 - Td) 3/3/2025 COLONOSCOPY 2026 Allergies as of 2018  Review Complete On: 2018 By: Dhaval Abreu NP No Known Allergies Current Immunizations  Reviewed on 3/13/2017 Name Date Tdap 3/3/2015 Not reviewed this visit You Were Diagnosed With   
  
 Codes Comments BV (bacterial vaginosis)    -  Primary ICD-10-CM: N76.0, B96.89 
ICD-9-CM: 616.10, 041.9 Allergic rhinitis, unspecified seasonality, unspecified trigger     ICD-10-CM: J30.9 ICD-9-CM: 477.9 Yeast vaginitis     ICD-10-CM: B37.3 ICD-9-CM: 112.1 Vitals BP Pulse Temp Resp Height(growth percentile) Weight(growth percentile) 128/78 65 98.3 °F (36.8 °C) (Oral) 17 5' 7\" (1.702 m) 164 lb 6.4 oz (74.6 kg) SpO2 BMI OB Status Smoking Status 98% 25.75 kg/m2 Hysterectomy Current Some Day Smoker Vitals History BMI and BSA Data Body Mass Index Body Surface Area 25.75 kg/m 2 1.88 m 2 Preferred Pharmacy Pharmacy Name Phone [email or web address], [address] [place] [address] [phone] Your Updated Medication List  
  
   
This list is accurate as of 18 12:53 PM.  Always use your most recent med list.  
  
  
  
  
 Arm Brace Misc Commonly known as:  NEOPRENE WRIST SPLINT SUPPORT Use as needed. betamethasone dipropionate 0.05 % topical cream  
Commonly known as:  Francoise Catherine Apply  to affected area two (2) times a day. CLARITIN-D 24 HOUR  mg per tablet Generic drug:  loratadine-pseudoephedrine Take 1 Tab by mouth daily. Clindamycin-Benzoyl Peroxide 1.2-2.5 % Gel Apply to dry clean skin BID  
  
 cyclobenzaprine 10 mg tablet Commonly known as:  FLEXERIL Take 1 Tab by mouth three (3) times daily as needed for Muscle Spasm(s). diclofenac 1 % Gel Commonly known as:  VOLTAREN Apply  to affected area four (4) times daily. To affected knee  
  
 fluconazole 150 mg tablet Commonly known as:  DIFLUCAN Take 1 Tab by mouth daily for 1 dose. FDA advises cautious prescribing of oral fluconazole in pregnancy. fluticasone 50 mcg/actuation nasal spray Commonly known as:  FLONASE  
1 [place] by Both Nostrils route daily. Indications: Allergic Rhinitis  
  
 metroNIDAZOLE 500 mg tablet Commonly known as:  FLAGYL Take 1 Tab by mouth two (2) times a day for 7 days. naproxen 500 mg tablet Commonly known as:  NAPROSYN Take 1 Tab by mouth two (2) times daily (with meals). Indications: Pain Prescriptions Sent to Pharmacy Refills  
 fluticasone (FLONASE) 50 mcg/actuation nasal spray 3 [place] by Both Nostrils route daily. Indications: Allergic Rhinitis Class: Normal  
 Pharmacy: [facility] # [address] Ph #: [phone] Route: Both Nostrils  
 metroNIDAZOLE (FLAGYL) 500 mg tablet 0 Sig: Take 1 Tab by mouth two (2) times a day for 7 days. Class: Normal  
 Pharmacy: [facility] # 8[address]  [address] Ph #: [phone] Route: Oral  
 fluconazole (DIFLUCAN) 150 mg tablet 0 Sig: Take 1 Tab by mouth daily for 1 dose. FDA advises cautious prescribing of oral fluconazole in pregnancy.   
 Class: Normal  
 Pharmacy: [facility] # [address] [facility] Ph #: [phone] Route: Oral  
 CLARITIN-D 24 HOUR  mg per tablet 1 Sig: Take 1 Tab by mouth daily. Class: Normal  
 Pharmacy: [facility] # 8[address] [facility] Ph #: [phone] Route: Oral  
  
Follow-up Instructions Return if symptoms worsen or fail to improve. Introducing [facility]! [facility] introduces [employer] patient portal. Now you can access parts of your medical record, email your doctor's office, and request medication refills online. 1. In your internet browser, go to [email or web address] 2. Click on the First Time User? Click Here link in the Sign In box. You will see the New Member Sign Up page. 3. Enter your Artimi Access Code exactly as it appears below. You will not need to use this code after youve completed the sign-up process. If you do not sign up before the expiration date, you must request a new code. · Artimi Access Code: [email or web address] Expires: 11/29/2018 12:53 PM 
 
4. Enter the last four digits of your Social Security Number (xxxx) and Date of Birth (mm/dd/yyyy) as indicated and click Submit. You will be taken to the next sign-up page. 5. Create a Artimi ID. This will be your Artimi login ID and cannot be changed, so think of one that is secure and easy to remember. 6. Create a Artimi password. You can change your password at any time. 7. Enter your Password Reset Question and Answer. This can be used at a later time if you forget your password. 8. Enter your e-mail address. You will receive e-mail notification when new information is available in [address] 9. Click Sign Up. You can now view and download portions of your medical record. 10. Click the Download Summary menu link to download a portable copy of your medical information.  
 
If you have questions, please visit the Frequently Asked Questions section of the [employer]. Remember, [employer] is NOT to be used for urgent needs. For medical emergencies, dial 911. Now available from your iPhone and Android! Please provide this summary of care documentation to your next provider. Your primary care clinician is listed as Tyson Pappas. If you have any questions after today's visit, please call [phone].

## 2018-08-31 NOTE — PROGRESS NOTES
Subjective:     Droeen Monteiro is a 47 y.o. female who complains of dysuria, frequency, urgency, bilaterally flank pain for 5 days. Patient denies nausea, vomiting, fever, abdominal pain, unusual vaginal discharge. Patient does not have a history of recurrent UTI. Patient does not have a history of pyelonephritis. Patient Active Problem List    Diagnosis Date Noted    Overweight (BMI 25.0-29.9) 02/22/2018    Family history of breast cancer in sister 02/20/2018    Acne 04/10/2016    Vitamin D deficiency 03/11/2015    CTS (carpal tunnel syndrome) 03/03/2015    Allergic rhinitis 03/03/2015     Current Outpatient Prescriptions   Medication Sig Dispense Refill    loratadine-pseudoephedrine (CLARITIN-D 24 HOUR)  mg per tablet TAKE 1 TAB BY MOUTH DAILYAS NEEDED. 90 Tab 2    betamethasone dipropionate (DIPROSONE) 0.05 % topical cream Apply  to affected area two (2) times a day. 45 g 1    cyclobenzaprine (FLEXERIL) 10 mg tablet Take 1 Tab by mouth three (3) times daily as needed for Muscle Spasm(s). 30 Tab 0    fluticasone (FLONASE) 50 mcg/actuation nasal spray 1 [place] by Both Nostrils route daily. Indications: ALLERGIC RHINITIS 1 Bottle 3    naproxen (NAPROSYN) 500 mg tablet Take 1 Tab by mouth two (2) times daily (with meals). Indications: Pain 30 Tab 0    diclofenac (VOLTAREN) 1 % gel Apply  to affected area four (4) times daily. To affected knee 4 g 4    Clindamycin-Benzoyl Peroxide 1.2-2.5 % gel Apply to dry clean skin BID 50 g 0    Arm Brace (NEOPRENE WRIST SPLINT SUPPORT) misc Use as needed. 1 Each 0     No Known Allergies  No past medical history on file.   Past Surgical History:   Procedure Laterality Date    HX HYSTERECTOMY       Family History   Problem Relation Age of Onset    Hypertension Mother     Diabetes Father     Cancer Sister      Social History   Substance Use Topics    Smoking status: Current Some Day Smoker     Types: Cigarettes    Smokeless tobacco: Former User    Alcohol use Yes        Review of Systems  Pertinent items are noted in HPI. Objective:     Visit Vitals    /78    Pulse 65    Temp 98.3 °F (36.8 °C) (Oral)    Resp 17    Ht 5' 7\" (1.702 m)    Wt 164 lb 6.4 oz (74.6 kg)    SpO2 98%    BMI 25.75 kg/m2     General:  alert, cooperative, no distress   Abdomen: soft, nontender, nondistended, no masses or organomegaly. Back:  CVA tenderness absent   :  defer exam     Laboratory:   Urine dipstick shows negative for all components. Micro exam: not done. Assessment/Plan:     Bacterial vaginosis and vaginal yeast     1. diflucan and flagyl  2. Maintain adequate hydration  3. May use OTC pyridium as desired, which will turn urine orange/red color  4. Follow up if symptoms not improving, and prn. ICD-10-CM ICD-9-CM    1. BV (bacterial vaginosis) N76.0 616.10 metroNIDAZOLE (FLAGYL) 500 mg tablet    B96.89 041.9    2. Allergic rhinitis, unspecified seasonality, unspecified trigger J30.9 477.9 fluticasone (FLONASE) 50 mcg/actuation nasal spray      CLARITIN-D 24 HOUR  mg per tablet      DISCONTINUED: CLARITIN 10 mg tablet   3. Yeast vaginitis B37.3 112.1 fluconazole (DIFLUCAN) 150 mg tablet     Per patient this is the same symptoms she had the last time she had BV and yeast.  RX given. An After Visit Summary was printed and given to the patient. All diagnosis have been discussed with the patient and all of the patient's questions have been answered. Follow-up Disposition:  Return if symptoms worsen or fail to improve. Milla Chow, [facility] [id number].   [facility], [place] [address]

## 2018-08-31 NOTE — PROGRESS NOTES
1. Have you been to the ER, urgent care clinic since your last visit? Hospitalized since your last visit? No    2. Have you seen or consulted any other health care providers outside of the [address] [place] [address] since your last visit? Include any pap smears or colon screening.  No

## 2018-10-15 ENCOUNTER — OFFICE VISIT (OUTPATIENT)
Dept: FAMILY MEDICINE CLINIC | Age: 53
End: 2018-10-15

## 2018-10-15 VITALS
DIASTOLIC BLOOD PRESSURE: 84 MMHG | SYSTOLIC BLOOD PRESSURE: 137 MMHG | WEIGHT: 164.2 LBS | HEART RATE: 68 BPM | RESPIRATION RATE: 17 BRPM | OXYGEN SATURATION: 100 % | HEIGHT: 67 IN | BODY MASS INDEX: 25.77 KG/M2

## 2018-10-15 DIAGNOSIS — E66.3 OVERWEIGHT (BMI 25.0-29.9): ICD-10-CM

## 2018-10-15 DIAGNOSIS — Z23 ENCOUNTER FOR IMMUNIZATION: ICD-10-CM

## 2018-10-15 DIAGNOSIS — Z00.00 WELL WOMAN EXAM (NO GYNECOLOGICAL EXAM): Primary | ICD-10-CM

## 2018-10-15 DIAGNOSIS — N95.1 HOT FLASH, MENOPAUSAL: ICD-10-CM

## 2018-10-15 DIAGNOSIS — E55.9 VITAMIN D DEFICIENCY: ICD-10-CM

## 2018-10-15 DIAGNOSIS — L30.9 ECZEMA, UNSPECIFIED TYPE: ICD-10-CM

## 2018-10-15 RX ORDER — GABAPENTIN 100 MG/1
100 CAPSULE ORAL
Qty: 30 CAP | Refills: 5 | Status: SHIPPED | OUTPATIENT
Start: 2018-10-15 | End: 2020-01-10

## 2018-10-15 RX ORDER — CLOCORTOLONE PIVALATE 0 G/G
CREAM TOPICAL 3 TIMES DAILY
Qty: 75 G | Refills: 1 | Status: SHIPPED | OUTPATIENT
Start: 2018-10-15 | End: 2020-01-17 | Stop reason: SDUPTHER

## 2018-10-15 RX ORDER — AMMONIUM LACTATE 12 G/100G
LOTION TOPICAL
Qty: 400 ML | Refills: 1 | Status: SHIPPED | OUTPATIENT
Start: 2018-10-15 | End: 2020-07-29 | Stop reason: SDUPTHER
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
## 2018-10-15 NOTE — PROGRESS NOTES
1. Have you been to the ER, urgent care clinic since your last visit? Hospitalized since your last visit? No    2. Have you seen or consulted any other health care providers outside of the [address] [place] [address] since your last visit? Include any pap smears or colon screening.  No

## 2018-10-15 NOTE — MR AVS SNAPSHOT
S[place] [place] [address] [facility] [address] [facility] 83 [id number] [phone] Patient: Smita Doe 
MRN: [id number] :1965 Visit Information Date & Time Provider Department Dept. Phone Encounter #  
 10/15/2018  9:30 AM Chris Becerra MD [facility] 13 [id number] Follow-up Instructions Return in about 1 year (around 10/15/2019) for physical.  
  
Upcoming Health Maintenance Date Due Pneumococcal 19-64 Medium Risk (1 of 1 - PPSV23) 1985 Shingrix Vaccine Age 50> (1 of 2) 2016 Influenza Age 5 to Adult 2018 PAP AKA CERVICAL CYTOLOGY 2019 BREAST CANCER SCRN MAMMOGRAM 2020 DTaP/Tdap/Td series (2 - Td) 3/3/2025 COLONOSCOPY 2026 Allergies as of 10/15/2018  Review Complete On: 10/15/2018 By: Chris Becerra MD  
 No Known Allergies Current Immunizations  Reviewed on 3/13/2017 Name Date Influenza Vaccine (Quad) PF  Incomplete Tdap 3/3/2015 Not reviewed this visit You Were Diagnosed With   
  
 Codes Comments Well woman exam (no gynecological exam)    -  Primary ICD-10-CM: Z00.00 ICD-9-CM: V70.0 Vitamin D deficiency     ICD-10-CM: E55.9 ICD-9-CM: 268.9 Hot flash, menopausal     ICD-10-CM: N95.1 ICD-9-CM: 627.2 Eczema, unspecified type     ICD-10-CM: L30.9 ICD-9-CM: 692.9 Overweight (BMI 25.0-29. 9)     ICD-10-CM: V00.4 ICD-9-CM: 278.02 Encounter for immunization     ICD-10-CM: E24 ICD-9-CM: V03.89 Vitals BP Pulse Resp Height(growth percentile) Weight(growth percentile) SpO2  
 137/84 68 17 5' 7\" (1.702 m) 164 lb 3.2 oz (74.5 kg) 100% BMI OB Status Smoking Status 25.72 kg/m2 Hysterectomy Current Some Day Smoker Vitals History BMI and BSA Data Body Mass Index Body Surface Area 25.72 kg/m 2 1.88 m 2 Preferred Pharmacy Pharmacy Name Phone [id number], [address] [place], [place] [phone] Your Updated Medication List  
  
   
This list is accurate as of 10/15/18 10:30 AM.  Always use your most recent med list.  
  
  
  
  
 ammonium lactate 12 % lotion Commonly known as:  LAC-HYDRIN  
rub in to affected area well Arm Brace Mis Commonly known as:  NEOPRENE WRIST SPLINT SUPPORT Use as needed. CLARITIN-D 24 HOUR  mg per tablet Generic drug:  loratadine-pseudoephedrine Take 1 Tab by mouth daily. Clindamycin-Benzoyl Peroxide 1.2-2.5 % Gel Apply to dry clean skin BID  
  
 clocortolone pivalate 0.1 % topical cream  
Commonly known as:  CLODERM Apply  to affected area three (3) times daily. cyclobenzaprine 10 mg tablet Commonly known as:  FLEXERIL Take 1 Tab by mouth three (3) times daily as needed for Muscle Spasm(s). diclofenac 1 % Gel Commonly known as:  VOLTAREN Apply  to affected area four (4) times daily. To affected knee  
  
 fluticasone 50 mcg/actuation nasal spray Commonly known as:  FLONASE  
1 [place] by Both Nostrils route daily. Indications: Allergic Rhinitis  
  
 gabapentin 100 mg capsule Commonly known as:  NEURONTIN Take 1 Cap by mouth nightly. naproxen 500 mg tablet Commonly known as:  NAPROSYN Take 1 Tab by mouth two (2) times daily (with meals). Indications: Pain Prescriptions Sent to Pharmacy Refills  
 gabapentin (NEURONTIN) 100 mg capsule 5 Sig: Take 1 Cap by mouth nightly. Class: Normal  
 Pharmacy:  Julio Negron [facility] # [id number] - [address] [facility] Ph #: [phone] Route: Oral  
 ammonium lactate (LAC-HYDRIN) 12 % lotion 1 Sig: rub in to affected area well Class: Normal  
 Pharmacy: [facility] # [id number] [facility] Ph #: [phone]  
 clocortolone pivalate (CLODERM) 0.1 % topical cream 1 Sig: Apply  to affected area three (3) times daily.   
 Class: Normal  
 Pharmacy: YESY BEYER [facility] # 8[address]  [address] [facility] Ph #: [phone] Route: Topical  
  
We Performed the Following INFLUENZA VIRUS VAC QUAD,SPLIT,PRESV FREE SYRINGE IM [id number] CPT(R)] Follow-up Instructions Return in about 1 year (around 10/15/2019) for physical.  
  
To-Do List   
 10/15/2018 Lab:  CBC WITH AUTOMATED DIFF   
  
 10/15/2018 Lab:  LIPID PANEL   
  
 10/15/2018 Lab:  METABOLIC PANEL, COMPREHENSIVE   
  
 10/15/2018 Lab:  TSH 3RD GENERATION   
  
 10/15/2018 Lab:  VITAMIN D, 25 HYDROXY Introducing [facility]! [facility] introduces [employer] patient portal. Now you can access parts of your medical record, email your doctor's office, and request medication refills online. 1. In your internet browser, go to [email or web address] 2. Click on the First Time User? Click Here link in the Sign In box. You will see the New Member Sign Up page. 3. Enter your [employer] Access Code exactly as it appears below. You will not need to use this code after youve completed the sign-up process. If you do not sign up before the expiration date, you must request a new code. · [employer] Access Code: [email or web address] Expires: 11/29/2018 12:53 PM 
 
4. Enter the last four digits of your Social Security Number (xxxx) and Date of Birth (mm/dd/yyyy) as indicated and click Submit. You will be taken to the next sign-up page. 5. Create a [employer] ID. This will be your [employer] login ID and cannot be changed, so think of one that is secure and easy to remember. 6. Create a [employer] password. You can change your password at any time. 7. Enter your Password Reset Question and Answer. This can be used at a later time if you forget your password. 8. Enter your e-mail address. You will receive e-mail notification when new information is available in [address] 9. Click Sign Up. You can now view and download portions of your medical record. 10. Click the Download Summary menu link to download a portable copy of your medical information. If you have questions, please visit the Frequently Asked Questions section of the [employer] website. Remember, [employer] is NOT to be used for urgent needs. For medical emergencies, dial 911. Now available from your iPhone and Android! Please provide this summary of care documentation to your next provider. Your primary care clinician is listed as Candise Snellen. If you have any questions after today's visit, please call [phone].

## 2018-10-16 DIAGNOSIS — E55.9 VITAMIN D DEFICIENCY: Primary | ICD-10-CM

## 2018-10-16 LAB
25(OH)D3 SERPL-MCNC: 22 NG/ML (ref 30–100)
ABSOLUTE BANDS, 67058: NORMAL
ABSOLUTE BLASTS: NORMAL
ABSOLUTE METAMYELOCYTES, 900360: NORMAL
ABSOLUTE MYELOCYTES: NORMAL
ABSOLUTE NRBC,ANRBC: NORMAL
ABSOLUTE PROMYELOCYTES: NORMAL
ALB/GLOBRATIO, 58C: 1.2 (CALC) (ref 1–2.5)
ALBUMIN SERPL-MCNC: 4.4 G/DL (ref 3.6–5.1)
ALP SERPL-CCNC: 84 U/L (ref 33–130)
ALT SERPL-CCNC: 15 U/L (ref 6–29)
AST SERPL W P-5'-P-CCNC: 15 U/L (ref 10–35)
BANDS,BANDS: NORMAL
BASOPHILS # BLD: 18 CELLS/UL (ref 0–200)
BASOPHILS NFR BLD: 0.3 %
BILIRUB SERPL-MCNC: 0.7 MG/DL (ref 0.2–1.2)
BLASTS,BLAST: NORMAL
BUN SERPL-MCNC: 13 MG/DL (ref 7–25)
BUN/CREATININE RATIO,BUCR: ABNORMAL (CALC) (ref 6–22)
CALCIUM SERPL-MCNC: 9.8 MG/DL (ref 8.6–10.4)
CHLORIDE SERPL-SCNC: 104 MMOL/L (ref 98–110)
CHOL/HDL RATIO,CHHDX: 2.6 (CALC)
CHOLEST SERPL-MCNC: 215 MG/DL
CO2 SERPL-SCNC: 23 MMOL/L (ref 20–32)
COMMENT(S): NORMAL
CREAT SERPL-MCNC: 0.63 MG/DL (ref 0.5–1.05)
EOSINOPHIL # BLD: 41 CELLS/UL (ref 15–500)
EOSINOPHIL NFR BLD: 0.7 %
ERYTHROCYTE [DISTWIDTH] IN BLOOD BY AUTOMATED COUNT: 13.7 % (ref 11–15)
GLOBULIN,GLOB: 3.6 G/DL (CALC) (ref 1.9–3.7)
GLUCOSE SERPL-MCNC: 106 MG/DL (ref 65–99)
HCT VFR BLD AUTO: 41.7 % (ref 35–45)
HDLC SERPL-MCNC: 82 MG/DL
HGB BLD-MCNC: 13.7 G/DL (ref 11.7–15.5)
LDL-CHOLESTEROL: 115 MG/DL (CALC)
LYMPHOCYTES # BLD: 2649 CELLS/UL (ref 850–3900)
LYMPHOCYTES NFR BLD: 44.9 %
MCH RBC QN AUTO: 27.4 PG (ref 27–33)
MCHC RBC AUTO-ENTMCNC: 32.9 G/DL (ref 32–36)
MCV RBC AUTO: 83.4 FL (ref 80–100)
METAMYELOCYTES,METAS: NORMAL
MONOCYTES # BLD: 242 CELLS/UL (ref 200–950)
MONOCYTES NFR BLD: 4.1 %
MYELOCYTES,MYELO: NORMAL
NEUTROPHILS # BLD AUTO: 2950 CELLS/UL (ref 1500–7800)
NEUTROPHILS # BLD: 50 %
NON-HDL CHOLESTEROL, 011976: 133 MG/DL (CALC)
NRBC: NORMAL
PLATELET # BLD AUTO: 340 THOUSAND/UL (ref 140–400)
PMV BLD AUTO: 9.9 FL (ref 7.5–12.5)
POTASSIUM SERPL-SCNC: 4.5 MMOL/L (ref 3.5–5.3)
PROMYELOCYTES,PRO: NORMAL
PROT SERPL-MCNC: 8 G/DL (ref 6.1–8.1)
RBC # BLD AUTO: 5 MILLION/UL (ref 3.8–5.1)
REACTIVE LYMPHS: NORMAL
SODIUM SERPL-SCNC: 140 MMOL/L (ref 135–146)
TRIGL SERPL-MCNC: 79 MG/DL (ref ?–150)
TSH SERPL DL<=0.005 MIU/L-ACNC: 0.77 MIU/L
WBC # BLD AUTO: 5.9 THOUSAND/UL (ref 3.8–10.8)

## 2018-10-16 RX ORDER — ERGOCALCIFEROL 1.25 MG/1
50000 CAPSULE ORAL
Qty: 12 CAP | Refills: 1 | Status: SHIPPED | OUTPATIENT
Start: 2018-10-16 | End: 2018-12-12 | Stop reason: SDUPTHER

## 2018-10-22 DIAGNOSIS — N95.1 HOT FLASH, MENOPAUSAL: Primary | ICD-10-CM

## 2018-10-22 RX ORDER — VENLAFAXINE HYDROCHLORIDE 37.5 MG/1
37.5 CAPSULE, EXTENDED RELEASE ORAL DAILY
Qty: 90 CAP | Refills: 1 | Status: SHIPPED | OUTPATIENT
Start: 2018-10-22 | End: 2018-12-12

## 2018-12-12 ENCOUNTER — OFFICE VISIT (OUTPATIENT)
Dept: FAMILY MEDICINE CLINIC | Age: 53
End: 2018-12-12

## 2018-12-12 VITALS
DIASTOLIC BLOOD PRESSURE: 77 MMHG | HEART RATE: 77 BPM | BODY MASS INDEX: 25.74 KG/M2 | RESPIRATION RATE: 16 BRPM | SYSTOLIC BLOOD PRESSURE: 121 MMHG | WEIGHT: 164 LBS | HEIGHT: 67 IN | TEMPERATURE: 97.2 F

## 2018-12-12 DIAGNOSIS — M25.562 CHRONIC PAIN OF BOTH KNEES: ICD-10-CM

## 2018-12-12 DIAGNOSIS — J30.9 ALLERGIC RHINITIS, UNSPECIFIED SEASONALITY, UNSPECIFIED TRIGGER: ICD-10-CM

## 2018-12-12 DIAGNOSIS — M25.512 LEFT SHOULDER PAIN, UNSPECIFIED CHRONICITY: ICD-10-CM

## 2018-12-12 DIAGNOSIS — M25.561 CHRONIC PAIN OF BOTH KNEES: ICD-10-CM

## 2018-12-12 DIAGNOSIS — E55.9 VITAMIN D DEFICIENCY: ICD-10-CM

## 2018-12-12 DIAGNOSIS — N95.1 HOT FLASH, MENOPAUSAL: Primary | ICD-10-CM

## 2018-12-12 DIAGNOSIS — G89.29 CHRONIC PAIN OF BOTH KNEES: ICD-10-CM

## 2018-12-12 DIAGNOSIS — E66.3 OVERWEIGHT (BMI 25.0-29.9): ICD-10-CM

## 2018-12-12 DIAGNOSIS — R73.01 IMPAIRED FASTING GLUCOSE: ICD-10-CM

## 2018-12-12 RX ORDER — DICLOFENAC SODIUM 10 MG/G
GEL TOPICAL 4 TIMES DAILY
Qty: 4 G | Refills: 4 | Status: SHIPPED | OUTPATIENT
Start: 2018-12-12 | End: 2020-01-10

## 2018-12-12 RX ORDER — FLUTICASONE PROPIONATE 50 MCG
1 SPRAY, SUSPENSION (ML) NASAL DAILY
Qty: 1 BOTTLE | Refills: 3 | Status: SHIPPED | OUTPATIENT
Start: 2018-12-12 | End: 2020-01-10

## 2018-12-12 RX ORDER — CYCLOBENZAPRINE HCL 10 MG
10 TABLET ORAL
Qty: 30 TAB | Refills: 0 | Status: SHIPPED | OUTPATIENT
Start: 2018-12-12 | End: 2021-01-13

## 2018-12-12 RX ORDER — ERGOCALCIFEROL 1.25 MG/1
50000 CAPSULE ORAL
Qty: 12 CAP | Refills: 1 | Status: SHIPPED | OUTPATIENT
Start: 2018-12-12 | End: 2020-01-10

## 2018-12-12 RX ORDER — CLONIDINE HYDROCHLORIDE 0.1 MG/1
0.1 TABLET ORAL
Qty: 90 TAB | Refills: 1 | Status: SHIPPED | OUTPATIENT
Start: 2018-12-12 | End: 2020-01-10

## 2018-12-12 RX ORDER — LORATADINE AND PSEUDOEPHEDRINE SULFATE 10; 240 MG/1; MG/1
1 TABLET, EXTENDED RELEASE ORAL DAILY
Qty: 90 TAB | Refills: 1 | Status: SHIPPED | OUTPATIENT
Start: 2018-12-12 | End: 2019-12-23 | Stop reason: SDUPTHER

## 2018-12-12 NOTE — PROGRESS NOTES
1. Have you been to the ER, urgent care clinic since your last visit? Hospitalized since your last visit? No    2. Have you seen or consulted any other health care providers outside of the [address] [place] [address] since your last visit? Include any pap smears or colon screening.  No

## 2018-12-12 NOTE — PROGRESS NOTES
Chief Complaint   Patient presents with    Headache    Medication Refill     pended       Pt is a 47y.o. year old female who presents for an acute visit for the above    Health Maintenance Due   Topic Date Due    Pneumococcal 19-64 Medium Risk (1 of 1 - PPSV23) 01/11/1985    Shingrix Vaccine Age 50> (1 of 2) 01/11/2016    PAP AKA CERVICAL CYTOLOGY  04/06/2019       Med refills needed    Wt Readings from Last 3 Encounters:   12/12/18 164 lb (74.4 kg)   10/15/18 164 lb 3.2 oz (74.5 kg)   08/31/18 164 lb 6.4 oz (74.6 kg)   BMI 25    BP Readings from Last 3 Encounters:   12/12/18 121/77   10/15/18 137/84   08/31/18 128/78   BP at work 147/83, headache at that time  BP today, good-repeated 3x  Family hx of HTN    Vit D rx-forgetting it at times she admits    Gabapentin not helping her hot flashes    Previous labs also showed slightly elevated glucose  Dad has DM    ROS:    Pt denies: Wt loss, Fever/Chills, HA, Visual changes, Fatigue, Chest pain, SOB, JEROME, Abd pain, N/V/D/C, Blood in stool or urine, Edema. Pertinent positive as above in HPI. All others were negative    Patient Active Problem List   Diagnosis Code    CTS (carpal tunnel syndrome) G56.00    Allergic rhinitis J30.9    Vitamin D deficiency E55.9    Acne L70.9    Family history of breast cancer in sister Z80.2    Overweight (BMI 25.0-29. 9) E66.3       History reviewed. No pertinent past medical history. Current Outpatient Medications   Medication Sig Dispense Refill    ergocalciferol (ERGOCALCIFEROL) 50,000 unit capsule Take 1 Cap by mouth every seven (7) days. 12 Cap 1    gabapentin (NEURONTIN) 100 mg capsule Take 1 Cap by mouth nightly. 30 Cap 5    ammonium lactate (LAC-HYDRIN) 12 % lotion rub in to affected area well 400 mL 1    clocortolone pivalate (CLODERM) 0.1 % topical cream Apply  to affected area three (3) times daily. 75 g 1    fluticasone (FLONASE) 50 mcg/actuation nasal spray 1 [place] by Both Nostrils route daily.  Indications: Allergic Rhinitis 1 Bottle 3    CLARITIN-D 24 HOUR  mg per tablet Take 1 Tab by mouth daily. 90 Tab 1    cyclobenzaprine (FLEXERIL) 10 mg tablet Take 1 Tab by mouth three (3) times daily as needed for Muscle Spasm(s). 30 Tab 0    diclofenac (VOLTAREN) 1 % gel Apply  to affected area four (4) times daily. To affected knee 4 g 4    Clindamycin-Benzoyl Peroxide 1.2-2.5 % gel Apply to dry clean skin BID 50 g 0    Arm Brace (NEOPRENE WRIST SPLINT SUPPORT) misc Use as needed. 1 Each 0    venlafaxine-SR (EFFEXOR-XR) 37.5 mg capsule Take 1 Cap by mouth daily. 90 Cap 1    naproxen (NAPROSYN) 500 mg tablet Take 1 Tab by mouth two (2) times daily (with meals). Indications: Pain 30 Tab 0       Social History     Tobacco Use   Smoking Status Current Some Day Smoker    Types: Cigarettes   Smokeless Tobacco Former User       No Known Allergies    Patient Labs were reviewed: yes      Patient Past Records were reviewed:  yes        Objective:     Vitals:    12/12/18 1317   BP: 121/77   Pulse: 77   Resp: 16   Temp: 97.2 °F (36.2 °C)   TempSrc: Oral   Weight: 164 lb (74.4 kg)   Height: 5' 7\" (1.702 m)     Body mass index is 25.69 kg/m². Exam:   Appearance: alert, well appearing,  oriented to person, place, and time, acyanotic, in no respiratory distress and well hydrated. HEENT:  NC/AT, pink conj, anicteric sclerae  Neck:  No cervical lymphadenopathy, no JVD, no thyromegaly, no carotid bruit  Heart:  RRR without M/R/G  Lungs:  CTAB, no rhonchi, rales, or wheezes with good air exchange   Abdomen:  Non-tender, pos bowel sounds, no hepatosplenomegaly  Ext:  No C/C/E    Skin: no rash  Neuro: no lateralizing signs, CNs II-XII intact      Assessment/ Plan:   Diagnoses and all orders for this visit:    1. Hot flash, menopausal-Gabapentin did not help; insurance will not pay for Effexor  -     cloNIDine HCl (CATAPRES) 0.1 mg tablet; Take 1 Tab by mouth nightly.  Explained how this owrks and possible side effects including sedation and hypotension    2. Impaired fasting glucose-advised to watch carbs in her diet, regular exercise  -     HEMOGLOBIN A1C W/O EAG; Future  -     GLUCOSE, RANDOM; Future    3. Vitamin D deficiency-compliance advised  -     ergocalciferol (ERGOCALCIFEROL) 50,000 unit capsule; Take 1 Cap by mouth every seven (7) days. -     VITAMIN D, 25 HYDROXY; Future    4. Allergic rhinitis, unspecified seasonality, unspecified trigger  -     CLARITIN-D 24 HOUR  mg per tablet; Take 1 Tab by mouth daily. -     fluticasone (FLONASE) 50 mcg/actuation nasal spray; 1 [place] by Both Nostrils route daily. 5. Left shoulder pain, unspecified chronicity  -     cyclobenzaprine (FLEXERIL) 10 mg tablet; Take 1 Tab by mouth three (3) times daily as needed for Muscle Spasm(s). 6. Chronic pain of both knees  -     diclofenac (VOLTAREN) 1 % gel; Apply  to affected area four (4) times daily. To affected knee    7. Overweight (BMI 25.0-29. 9)-discussed limiting isidro to 6973-9304/day and exercising at least 150 min a week        Follow-up Disposition: prn        I have discussed the diagnosis with the patient and the intended plan as seen in the above orders. The patient has received an After-Visit Summary and questions were answered concerning future plans. Medication Side Effects and Warnings were discussed with patient: yes    Patient verbalized understanding of above instructions.     Opal Yepez MD  Internal Medicine  [facility]

## 2018-12-13 LAB
25(OH)D3 SERPL-MCNC: 41 NG/ML (ref 30–100)
GLUCOSE SERPL-MCNC: 87 MG/DL (ref 65–99)
HBA1C MFR BLD HPLC: 5.9 % OF TOTAL HGB

## 2019-04-15 ENCOUNTER — TELEPHONE (OUTPATIENT)
Dept: FAMILY MEDICINE CLINIC | Age: 54
End: 2019-04-15

## 2019-04-15 NOTE — TELEPHONE ENCOUNTER
Patient is requesting something for pain for her hand. She will schedule appointment with hand surgeon today after she leaves the office. Patient requested narcotic. Patient informed that she would have to be seen to be evaluated for that type of medication.

## 2019-04-16 DIAGNOSIS — G56.03 BILATERAL CARPAL TUNNEL SYNDROME: Primary | ICD-10-CM

## 2019-04-16 RX ORDER — NAPROXEN 500 MG/1
500 TABLET ORAL
Qty: 60 TAB | Refills: 1 | Status: SHIPPED | OUTPATIENT
Start: 2019-04-16 | End: 2020-07-29 | Stop reason: SDUPTHER

## 2019-12-23 ENCOUNTER — OFFICE VISIT (OUTPATIENT)
Dept: FAMILY MEDICINE CLINIC | Age: 54
End: 2019-12-23

## 2019-12-23 VITALS
TEMPERATURE: 97.5 F | WEIGHT: 167.3 LBS | SYSTOLIC BLOOD PRESSURE: 123 MMHG | DIASTOLIC BLOOD PRESSURE: 67 MMHG | HEIGHT: 67 IN | OXYGEN SATURATION: 98 % | HEART RATE: 74 BPM | BODY MASS INDEX: 26.26 KG/M2 | RESPIRATION RATE: 18 BRPM

## 2019-12-23 DIAGNOSIS — J30.9 ALLERGIC RHINITIS, UNSPECIFIED SEASONALITY, UNSPECIFIED TRIGGER: ICD-10-CM

## 2019-12-23 RX ORDER — FLUCONAZOLE 150 MG/1
150 TABLET ORAL DAILY
Qty: 1 TAB | Refills: 0 | Status: SHIPPED | OUTPATIENT
Start: 2019-12-23 | End: 2019-12-24

## 2019-12-23 RX ORDER — LORATADINE AND PSEUDOEPHEDRINE SULFATE 10; 240 MG/1; MG/1
1 TABLET, EXTENDED RELEASE ORAL DAILY
Qty: 90 TAB | Refills: 1 | Status: SHIPPED | OUTPATIENT
Start: 2019-12-23 | End: 2020-07-29 | Stop reason: SDUPTHER

## 2019-12-23 NOTE — PROGRESS NOTES
Chief Complaint   Patient presents with    Physical     Patient not fasting     1. Have you been to the ER, urgent care clinic since your last visit? Hospitalized since your last visit? No    2. Have you seen or consulted any other health care providers outside of the [address] [place] [address] since your last visit? Include any pap smears or colon screening. No     Patient declined influenza vaccine.

## 2020-01-10 ENCOUNTER — OFFICE VISIT (OUTPATIENT)
Dept: FAMILY MEDICINE CLINIC | Age: 55
End: 2020-01-10

## 2020-01-10 VITALS
SYSTOLIC BLOOD PRESSURE: 129 MMHG | WEIGHT: 166 LBS | DIASTOLIC BLOOD PRESSURE: 77 MMHG | HEART RATE: 71 BPM | HEIGHT: 67 IN | RESPIRATION RATE: 17 BRPM | BODY MASS INDEX: 26.06 KG/M2

## 2020-01-10 DIAGNOSIS — E66.3 OVERWEIGHT (BMI 25.0-29.9): ICD-10-CM

## 2020-01-10 DIAGNOSIS — N62 MACROMASTIA: ICD-10-CM

## 2020-01-10 DIAGNOSIS — Z00.00 WELL WOMAN EXAM (NO GYNECOLOGICAL EXAM): Primary | ICD-10-CM

## 2020-01-10 DIAGNOSIS — E55.9 VITAMIN D DEFICIENCY: ICD-10-CM

## 2020-01-10 DIAGNOSIS — M79.641 RIGHT HAND PAIN: ICD-10-CM

## 2020-01-10 NOTE — PATIENT INSTRUCTIONS
Well Visit, Women 48 to 72: Care Instructions  Your Care Instructions    Physical exams can help you stay healthy. Your doctor has checked your overall health and may have suggested ways to take good care of yourself. He or she also may have recommended tests. At home, you can help prevent illness with healthy eating, regular exercise, and other steps. Follow-up care is a key part of your treatment and safety. Be sure to make and go to all appointments, and call your doctor if you are having problems. It's also a good idea to know your test results and keep a list of the medicines you take. How can you care for yourself at home? · Reach and stay at a healthy weight. This will lower your risk for many problems, such as obesity, diabetes, heart disease, and high blood pressure. · Get at least 30 minutes of exercise on most days of the week. Walking is a good choice. You also may want to do other activities, such as running, swimming, cycling, or playing tennis or team sports. · Do not smoke. Smoking can make health problems worse. If you need help quitting, talk to your doctor about stop-smoking programs and medicines. These can increase your chances of quitting for good. · Protect your skin from too much sun. When you're outdoors from 10 a.m. to 4 p.m., stay in the shade or cover up with clothing and a hat with a wide brim. Wear sunglasses that block UV rays. Even when it's cloudy, put broad-spectrum sunscreen (SPF 30 or higher) on any exposed skin. · See a dentist one or two times a year for checkups and to have your teeth cleaned. · Wear a seat belt in the car. Follow your doctor's advice about when to have certain tests. These tests can spot problems early. · Cholesterol. Your doctor will tell you how often to have this done based on your age, family history, or other things that can increase your risk for heart attack and stroke. · Blood pressure.  Have your blood pressure checked during a routine doctor visit. Your doctor will tell you how often to check your blood pressure based on your age, your blood pressure results, and other factors. · Mammogram. Ask your doctor how often you should have a mammogram, which is an X-ray of your breasts. A mammogram can spot breast cancer before it can be felt and when it is easiest to treat. · Pap test and pelvic exam. Ask your doctor how often you should have a Pap test. You may not need to have a Pap test as often as you used to. · Vision. Have your eyes checked every year or two or as often as your doctor suggests. Some experts recommend that you have yearly exams for glaucoma and other age-related eye problems starting at age 48. · Hearing. Tell your doctor if you notice any change in your hearing. You can have tests to find out how well you hear. · Diabetes. Ask your doctor whether you should have tests for diabetes. · Colorectal cancer. Your risk for colorectal cancer gets higher as you get older. Some experts say that adults should start regular screening at age 48 and stop at age 76. Others say to start before age 48 or continue after age 76. Talk with your doctor about your risk and when to start and stop screening. · Thyroid disease. Talk to your doctor about whether to have your thyroid checked as part of a regular physical exam. Women have an increased chance of a thyroid problem. · Osteoporosis. You should begin tests for bone density at age 72. If you are younger than 72, ask your doctor whether you have factors that may increase your risk for this disease. You may want to have this test before age 72. · Heart attack and stroke risk. At least every 4 to 6 years, you should have your risk for heart attack and stroke assessed. Your doctor uses factors such as your age, blood pressure, cholesterol, and whether you smoke or have diabetes to show what your risk for a heart attack or stroke is over the next 10 years.   When should you call for help?  Watch closely for changes in your health, and be sure to contact your doctor if you have any problems or symptoms that concern you. Where can you learn more? Go to http://dolores-alhaji.info/. Enter E766 in the search box to learn more about \"Well Visit, Women 50 to 72: Care Instructions. \"  Current as of: December 13, 2018  Content Version: 12.2  © 3734-1422 [employer], [employer]. Care instructions adapted under license by [employer] (which disclaims liability or warranty for this information). If you have questions about a medical condition or this instruction, always ask your healthcare professional. [address] any warranty or liability for your use of this information.

## 2020-01-10 NOTE — PROGRESS NOTES
Chief Complaint   Patient presents with    Complete Physical     Patient is fasting     1. Have you been to the ER, urgent care clinic since your last visit? Hospitalized since your last visit? No    2. Have you seen or consulted any other health care providers outside of the [address] [place] [address] since your last visit? Include any pap smears or colon screening.  No

## 2020-01-10 NOTE — PROGRESS NOTES
Chief Complaint   Patient presents with    Complete Physical     Patient is fasting       Pt is a 48y.o. year old female who presents for follow up of her chronic medical problems      Health Maintenance Due   Topic Date Due    Pneumococcal 0-64 years (1 of 1 - PPSV23)-at age 73 01/11/1972    Shingrix Vaccine Age 50> (1 of 2)-at her pharmacy 01/11/2016    PAP AKA CERVICAL CYTOLOGY -hysterectomy for fibroids 04/06/2019       Wt Readings from Last 3 Encounters:   01/10/20 166 lb (75.3 kg)   12/23/19 167 lb 4.8 oz (75.9 kg)   12/12/18 164 lb (74.4 kg)     Fasting today  mammo normal-due in June    Lab Results   Component Value Date/Time    Vitamin D 25-Hydroxy 30.3 04/27/2017 12:18 PM    VITAMIN D, 25-HYDROXY 15 (L) 01/10/2020 11:43 AM         Right hand-stiffness and pain on the ulnar side  Was prev given shots by Dr Amandeep Waldron on the left hand in 2018  Left handed    CTS-better    Hot flashes-not taking any med; advised to try OTC Amberen      ROS:    Pt denies: Wt loss, Fever/Chills, HA, Visual changes, Fatigue, Chest pain, SOB, JEROME, Abd pain, N/V/D/C, Blood in stool or urine, Edema. Pertinent positive as above in HPI. All others were negative    Patient Active Problem List   Diagnosis Code    CTS (carpal tunnel syndrome) G56.00    Allergic rhinitis J30.9    Vitamin D deficiency E55.9    Acne L70.9    Family history of breast cancer in sister Z80.2    Overweight (BMI 25.0-29. 9) E66.3       History reviewed. No pertinent past medical history. Current Outpatient Medications   Medication Sig Dispense Refill    CLARITIN-D 24 HOUR  mg per tablet Take 1 Tab by mouth daily. 90 Tab 1    naproxen (NAPROSYN) 500 mg tablet Take 1 Tab by mouth two (2) times daily as needed (wrist pain). 60 Tab 1    cyclobenzaprine (FLEXERIL) 10 mg tablet Take 1 Tab by mouth three (3) times daily as needed for Muscle Spasm(s).  30 Tab 0    ammonium lactate (LAC-HYDRIN) 12 % lotion rub in to affected area well 400 mL 1    Clindamycin-Benzoyl Peroxide 1.2-2.5 % gel Apply to dry clean skin BID 50 g 0    Arm Brace (NEOPRENE WRIST SPLINT SUPPORT) misc Use as needed. 1 Each 0    clocortolone pivalate (CLODERM) 0.1 % topical cream Apply  to affected area three (3) times daily. 75 g 1    ergocalciferol (ERGOCALCIFEROL) 1,250 mcg (50,000 unit) capsule Take 1 Cap by mouth every seven (7) days. 12 Cap 1       Social History     Tobacco Use   Smoking Status Current Some Day Smoker    Types: Cigarettes   Smokeless Tobacco Former User       No Known Allergies    Patient Labs were reviewed: yes      Patient Past Records were reviewed:  yes        Objective:     Vitals:    01/10/20 0949   BP: 129/77   Pulse: 71   Resp: 17   Weight: 166 lb (75.3 kg)   Height: 5' 7\" (1.702 m)     Body mass index is 26 kg/m². Exam:   Appearance: alert, well appearing,  oriented to person, place, and time, acyanotic, in no respiratory distress and well hydrated. HEENT:  NC/AT, pink conj, anicteric sclerae  Neck:  No cervical lymphadenopathy, no JVD, no thyromegaly, no carotid bruit  Heart:  RRR without M/R/G  Lungs:  CTAB, no rhonchi, rales, or wheezes with good air exchange   Abdomen:  Non-tender, pos bowel sounds, no hepatosplenomegaly  Ext:  No C/C/E    Skin: no rash  Neuro: no lateralizing signs, CNs II-XII intact  Breast exam: no palpable masses bilaterally, no nipple discharge, no axillary adenopathy, no skin changes; macromastia noted with large pendulous breasts and deep groves on the shoulders from the bra straps    Assessment/ Plan:   Diagnoses and all orders for this visit:    1. Well woman exam (no gynecological exam)-Advised re: monthly self breast exam, dental prophylaxis Q 6 months, regular exercise, yearly eye exam, daily intake of Ca+D  -     CBC WITH AUTOMATED DIFF; Future  -     METABOLIC PANEL, COMPREHENSIVE; Future  -     LIPID PANEL; Future    2. Vitamin D deficiency-on rx  -     VITAMIN D, 25 HYDROXY; Future    3.  Right hand pain  - XR HAND RT MIN 3 V; Future    4. Macromastia  -     REFERRAL TO PLASTIC SURGERY    5. Overweight-discussed limiting isidro to 4120-6229/day and exercising at least 150 min a week    Follow-up and Dispositions    · Return in about 1 year (around 1/10/2021) for physical.             I have discussed the diagnosis with the patient and the intended plan as seen in the above orders. The patient has received an After-Visit Summary and questions were answered concerning future plans. Medication Side Effects and Warnings were discussed with patient: yes    Patient verbalized understanding of above instructions.     Brannon Lizama MD  Internal Medicine  [facility]

## 2020-01-11 LAB
25(OH)D3 SERPL-MCNC: 15 NG/ML (ref 30–100)
ABSOLUTE BANDS, 67058: ABNORMAL
ABSOLUTE BLASTS: ABNORMAL
ABSOLUTE METAMYELOCYTES, 900360: ABNORMAL
ABSOLUTE MYELOCYTES: ABNORMAL
ABSOLUTE NRBC,ANRBC: ABNORMAL
ABSOLUTE PROMYELOCYTES: ABNORMAL
ALB/GLOBRATIO, 58C: 1.5 (CALC) (ref 1–2.5)
ALBUMIN SERPL-MCNC: 3.8 G/DL (ref 3.6–5.1)
ALP SERPL-CCNC: 61 U/L (ref 33–130)
ALT SERPL-CCNC: 18 U/L (ref 6–29)
AST SERPL W P-5'-P-CCNC: 14 U/L (ref 10–35)
BANDS,BANDS: ABNORMAL
BASOPHILS # BLD: 30 CELLS/UL (ref 0–200)
BASOPHILS NFR BLD: 0.5 %
BILIRUB SERPL-MCNC: 0.7 MG/DL (ref 0.2–1.2)
BLASTS,BLAST: ABNORMAL
BUN SERPL-MCNC: 13 MG/DL (ref 7–25)
BUN/CREATININE RATIO,BUCR: ABNORMAL (CALC) (ref 6–22)
CALCIUM SERPL-MCNC: 8.9 MG/DL (ref 8.6–10.4)
CHLORIDE SERPL-SCNC: 107 MMOL/L (ref 98–110)
CHOL/HDL RATIO,CHHDX: 2.4 (CALC)
CHOLEST SERPL-MCNC: 170 MG/DL
CO2 SERPL-SCNC: 27 MMOL/L (ref 20–32)
COMMENT(S): ABNORMAL
CREAT SERPL-MCNC: 0.7 MG/DL (ref 0.5–1.05)
EOSINOPHIL # BLD: 204 CELLS/UL (ref 15–500)
EOSINOPHIL NFR BLD: 3.4 %
ERYTHROCYTE [DISTWIDTH] IN BLOOD BY AUTOMATED COUNT: 13.2 % (ref 11–15)
GLOBULIN,GLOB: 2.5 G/DL (CALC) (ref 1.9–3.7)
GLUCOSE SERPL-MCNC: 104 MG/DL (ref 65–99)
HCT VFR BLD AUTO: 42.4 % (ref 35–45)
HDLC SERPL-MCNC: 71 MG/DL
HGB BLD-MCNC: 14 G/DL (ref 11.7–15.5)
LDL-CHOLESTEROL: 84 MG/DL (CALC)
LYMPHOCYTES # BLD: 3366 CELLS/UL (ref 850–3900)
LYMPHOCYTES NFR BLD: 56.1 %
MCH RBC QN AUTO: 26.7 PG (ref 27–33)
MCHC RBC AUTO-ENTMCNC: 33 G/DL (ref 32–36)
MCV RBC AUTO: 80.9 FL (ref 80–100)
METAMYELOCYTES,METAS: ABNORMAL
MONOCYTES # BLD: 270 CELLS/UL (ref 200–950)
MONOCYTES NFR BLD: 4.5 %
MYELOCYTES,MYELO: ABNORMAL
NEUTROPHILS # BLD AUTO: 2130 CELLS/UL (ref 1500–7800)
NEUTROPHILS # BLD: 35.5 %
NON-HDL CHOLESTEROL, 011976: 99 MG/DL (CALC)
NRBC: ABNORMAL
PLATELET # BLD AUTO: 346 THOUSAND/UL (ref 140–400)
PMV BLD AUTO: 9.8 FL (ref 7.5–12.5)
POTASSIUM SERPL-SCNC: 4.6 MMOL/L (ref 3.5–5.3)
PROMYELOCYTES,PRO: ABNORMAL
PROT SERPL-MCNC: 6.3 G/DL (ref 6.1–8.1)
RBC # BLD AUTO: 5.24 MILLION/UL (ref 3.8–5.1)
REACTIVE LYMPHS: ABNORMAL
SODIUM SERPL-SCNC: 141 MMOL/L (ref 135–146)
TRIGL SERPL-MCNC: 69 MG/DL (ref ?–150)
WBC # BLD AUTO: 6 THOUSAND/UL (ref 3.8–10.8)

## 2020-01-15 DIAGNOSIS — E55.9 VITAMIN D DEFICIENCY: Primary | ICD-10-CM

## 2020-01-15 RX ORDER — ERGOCALCIFEROL 1.25 MG/1
50000 CAPSULE ORAL
Qty: 12 CAP | Refills: 1 | Status: SHIPPED | OUTPATIENT
Start: 2020-01-15 | End: 2021-01-13 | Stop reason: ALTCHOICE

## 2020-01-16 NOTE — PROGRESS NOTES
pls let patient know-cholesterol is improved from 215 to 170 which is great  Vit d is low so I will send rx  No anemia,  kidney and liver tests normal  Blood sugar slightly elevated-recheck next visit-not diabetic level

## 2020-01-17 ENCOUNTER — TELEPHONE (OUTPATIENT)
Dept: FAMILY MEDICINE CLINIC | Age: 55
End: 2020-01-17

## 2020-01-17 DIAGNOSIS — L30.9 ECZEMA, UNSPECIFIED TYPE: ICD-10-CM

## 2020-01-17 RX ORDER — CLOCORTOLONE PIVALATE 0 G/G
CREAM TOPICAL 3 TIMES DAILY
Qty: 75 G | Refills: 1 | Status: SHIPPED | OUTPATIENT
Start: 2020-01-17 | End: 2020-01-21 | Stop reason: CLARIF

## 2020-01-21 DIAGNOSIS — L30.9 ECZEMA, UNSPECIFIED TYPE: Primary | ICD-10-CM

## 2020-01-21 RX ORDER — DESONIDE 0.5 MG/G
CREAM TOPICAL 2 TIMES DAILY
Qty: 60 G | Refills: 0 | Status: SHIPPED | OUTPATIENT
Start: 2020-01-21 | End: 2021-01-13 | Stop reason: ALTCHOICE

## 2020-07-27 ENCOUNTER — TELEPHONE (OUTPATIENT)
Dept: FAMILY MEDICINE CLINIC | Age: 55
End: 2020-07-27

## 2020-07-29 ENCOUNTER — VIRTUAL VISIT (OUTPATIENT)
Dept: FAMILY MEDICINE CLINIC | Age: 55
End: 2020-07-29

## 2020-07-29 DIAGNOSIS — J30.9 ALLERGIC RHINITIS, UNSPECIFIED SEASONALITY, UNSPECIFIED TRIGGER: ICD-10-CM

## 2020-07-29 DIAGNOSIS — E55.9 VITAMIN D DEFICIENCY: ICD-10-CM

## 2020-07-29 DIAGNOSIS — B37.31 YEAST VAGINITIS: Primary | ICD-10-CM

## 2020-07-29 DIAGNOSIS — L30.9 ECZEMA, UNSPECIFIED TYPE: ICD-10-CM

## 2020-07-29 DIAGNOSIS — G56.03 BILATERAL CARPAL TUNNEL SYNDROME: ICD-10-CM

## 2020-07-29 DIAGNOSIS — Z12.39 SCREENING FOR BREAST CANCER: ICD-10-CM

## 2020-07-29 RX ORDER — LORATADINE AND PSEUDOEPHEDRINE SULFATE 10; 240 MG/1; MG/1
1 TABLET, EXTENDED RELEASE ORAL DAILY
Qty: 90 TAB | Refills: 1 | Status: SHIPPED | OUTPATIENT
Start: 2020-07-29 | End: 2021-01-13 | Stop reason: SDUPTHER

## 2020-07-29 RX ORDER — AMMONIUM LACTATE 12 G/100G
LOTION TOPICAL
Qty: 400 ML | Refills: 1 | Status: SHIPPED | OUTPATIENT
Start: 2020-07-29 | End: 2021-01-13 | Stop reason: ALTCHOICE

## 2020-07-29 RX ORDER — FLUCONAZOLE 150 MG/1
TABLET ORAL
Qty: 2 TAB | Refills: 0 | Status: SHIPPED | OUTPATIENT
Start: 2020-07-29 | End: 2021-01-13 | Stop reason: ALTCHOICE

## 2020-07-29 RX ORDER — NAPROXEN 500 MG/1
500 TABLET ORAL
Qty: 60 TAB | Refills: 1 | Status: SHIPPED | OUTPATIENT
Start: 2020-07-29 | End: 2021-01-13 | Stop reason: ALTCHOICE

## 2020-07-29 NOTE — PROGRESS NOTES
Chief Complaint   Patient presents with    Vaginal Itching     1. Have you been to the ER, urgent care clinic since your last visit? Hospitalized since your last visit? No    2. Have you seen or consulted any other health care providers outside of the [address] [place] [address] since your last visit? Include any pap smears or colon screening.  No

## 2020-07-29 NOTE — PATIENT INSTRUCTIONS

## 2020-07-29 NOTE — PROGRESS NOTES
Imani Trujillo is a 48 y.o. female who was seen by synchronous (real-time) audio-video technology on 7/29/2020 for Vaginal Itching and Follow Up Chronic Condition        Assessment & Plan:   Diagnoses and all orders for this visit:    1. Yeast vaginitis  -     fluconazole (DIFLUCAN) 150 mg tablet; Take 1 pill now; may take another pill in 72 hours if symptoms persist    2. Bilateral carpal tunnel syndrome-continue to use wrist splint when working; prn Naprosyn for pain  -     naproxen (NAPROSYN) 500 mg tablet; Take 1 Tab by mouth two (2) times daily as needed (wrist pain). 3. Eczema, unspecified type  -     ammonium lactate (LAC-HYDRIN) 12 % lotion; rub in to affected area well    4. Allergic rhinitis, unspecified seasonality, unspecified trigger  -     Claritin-D 24 Hour  mg per tablet; Take 1 Tab by mouth daily. 5. Screening for breast cancer-will do this at [facility] (fam hx of breast cancer in sister)  -     [place] [facility] MAMMO BI SCREENING INCL CAD; Future    6. Vit D def-on rx; recheck level next visit    Follow-up and Dispositions    · Return in about 6 months (around 1/29/2021) for follow up. 712  Subjective:     Health Maintenance Due   Topic Date Due    Pneumococcal 0-64 years (1 of 1 - PPSV23)-at age 73 01/11/1971    Shingrix Vaccine Age 50> (1 of 2)-advised to fabien montague at her pharmacy 01/11/2016    Breast Cancer Screen Mammogram -ordered 06/03/2020       Feels better today but has been having sxs like she did before  Happens when using certain soaps-usually gives her itching in the vaginal area  Wetness but no discharge    Needs Claritin refill as well-helps her allergies     Lab Results   Component Value Date/Time    Vitamin D 25-Hydroxy 30.3 04/27/2017 12:18 PM    VITAMIN D, 25-HYDROXY 15 (L) 01/10/2020 11:43 AM     On rx      Prior to Admission medications    Medication Sig Start Date End Date Taking?  Authorizing Provider   ammonium lactate (LAC-HYDRIN) 12 % lotion rub in to affected area well 7/29/20  Yes Olena Ramirez MD   naproxen (NAPROSYN) 500 mg tablet Take 1 Tab by mouth two (2) times daily as needed (wrist pain). 7/29/20  Yes Olena Ramirez MD   Claritin-D 24 Hour  mg per tablet Take 1 Tab by mouth daily. 7/29/20  Yes Dannie Kemp MD   fluconazole (DIFLUCAN) 150 mg tablet Take 1 pill now; may take another pill in 72 hours if symptoms persist 7/29/20  Yes Olena Ramirez MD   desonide (TRIDESILON) 0.05 % cream Apply  to affected area two (2) times a day. 1/21/20  Yes Dannie Kemp MD   ergocalciferol (ERGOCALCIFEROL) 1,250 mcg (50,000 unit) capsule Take 1 Cap by mouth every seven (7) days. 1/15/20   Dannie Kemp MD   CLARITIN-D 24 HOUR  mg per tablet Take 1 Tab by mouth daily. 12/23/19 7/29/20  Dannie Kemp MD   naproxen (NAPROSYN) 500 mg tablet Take 1 Tab by mouth two (2) times daily as needed (wrist pain). 4/16/19 7/29/20  Dannie Kemp MD   cyclobenzaprine (FLEXERIL) 10 mg tablet Take 1 Tab by mouth three (3) times daily as needed for Muscle Spasm(s). 12/12/18   Dannie Kemp MD   ammonium lactate (LAC-HYDRIN) 12 % lotion rub in to affected area well 10/15/18 7/29/20  Dannie Kemp MD   Clindamycin-Benzoyl Peroxide 1.2-2.5 % gel Apply to dry clean skin BID 4/6/16   Dannie Kemp MD   Arm Brace (NEOPRENE WRIST SPLINT SUPPORT) misc Use as needed. 3/30/15   Lucas Hidalgo NP     Patient Active Problem List    Diagnosis Date Noted    Overweight (BMI 25.0-29.9) 02/22/2018    Family history of breast cancer in sister 02/20/2018    Acne 04/10/2016    Vitamin D deficiency 03/11/2015    CTS (carpal tunnel syndrome) 03/03/2015    Allergic rhinitis 03/03/2015       ROS  Pt denies: Wt loss, Fever/Chills, HA, Visual changes, Fatigue, Chest pain, SOB, JEROME, Abd pain, N/V/D/C, Blood in stool or urine, Edema. Pertinent positive as above in HPI. All others were negative  Objective:   No flowsheet data found. General: alert, cooperative, no distress   Mental  status: normal mood, behavior, speech, dress, motor activity, and thought processes, able to follow commands   HENT: NCAT   Neck: no visualized mass   Resp: no respiratory distress   Neuro: no gross deficits   Skin: no discoloration or lesions of concern on visible areas   Psychiatric: normal affect, consistent with stated mood, no evidence of hallucinations     Additional exam findings: none      We discussed the expected course, resolution and complications of the diagnosis(es) in detail. Medication risks, benefits, costs, interactions, and alternatives were discussed as indicated. I advised her to contact the office if her condition worsens, changes or fails to improve as anticipated. She expressed understanding with the diagnosis(es) and plan. Niki Sutton, who was evaluated through a patient-initiated, synchronous (real-time) audio-video encounter, and/or her healthcare decision maker, is aware that it is a billable service, with coverage as determined by her insurance carrier. She provided verbal consent to proceed: Yes, and patient identification was verified. It was conducted pursuant to the emergency declaration under the [address] [place] [address] authority and the [employer] General Act. A caregiver was present when appropriate. Ability to conduct physical exam was limited. I was at home. The patient was at home.       Kristin Sultana MD

## 2020-08-04 ENCOUNTER — TELEPHONE (OUTPATIENT)
Dept: FAMILY MEDICINE CLINIC | Age: 55
End: 2020-08-04

## 2020-08-04 DIAGNOSIS — N89.8 VAGINAL DISCHARGE: Primary | ICD-10-CM

## 2020-08-04 RX ORDER — METRONIDAZOLE 500 MG/1
500 TABLET ORAL 2 TIMES DAILY
Qty: 14 TAB | Refills: 0 | Status: SHIPPED | OUTPATIENT
Start: 2020-08-04 | End: 2020-08-11

## 2021-01-12 NOTE — PROGRESS NOTES
Patient dx with Covid, requesting inhaler and steroid. Patient not taking any medications at this time. Meds to [facility]    1. Have you been to the ER, urgent care clinic since your last visit? Hospitalized since your last visit? Yes Where: [address] with Covid    2. Have you seen or consulted any other health care providers outside of the [address] [place] [address] since your last visit? Include any pap smears or colon screening.  No     Health Maintenance Due   Topic Date Due    Pneumococcal 0-64 years (1 of 1 - PPSV23) 01[birth year]11/1972    Shingrix Vaccine Age 50> (1 of 2) 01/11/2016    Flu Vaccine (1) 09/01/2020

## 2021-01-13 ENCOUNTER — VIRTUAL VISIT (OUTPATIENT)
Dept: FAMILY MEDICINE CLINIC | Age: 56
End: 2021-01-13
Payer: COMMERCIAL

## 2021-01-13 DIAGNOSIS — E55.9 VITAMIN D DEFICIENCY: ICD-10-CM

## 2021-01-13 DIAGNOSIS — U07.1 COVID-19: Primary | ICD-10-CM

## 2021-01-13 DIAGNOSIS — U07.1 COVID-19: ICD-10-CM

## 2021-01-13 DIAGNOSIS — J30.9 ALLERGIC RHINITIS, UNSPECIFIED SEASONALITY, UNSPECIFIED TRIGGER: ICD-10-CM

## 2021-01-13 PROCEDURE — 99213 OFFICE O/P EST LOW 20 MIN: CPT | Performed by: INTERNAL MEDICINE

## 2021-01-13 RX ORDER — ALBUTEROL SULFATE 90 UG/1
2 AEROSOL, METERED RESPIRATORY (INHALATION)
Qty: 1 INHALER | Refills: 0 | Status: SHIPPED | OUTPATIENT
Start: 2021-01-13 | End: 2021-01-13 | Stop reason: SDUPTHER

## 2021-01-13 RX ORDER — ALBUTEROL SULFATE 90 UG/1
2 AEROSOL, METERED RESPIRATORY (INHALATION)
Qty: 1 INHALER | Refills: 0 | Status: SHIPPED | OUTPATIENT
Start: 2021-01-13 | End: 2021-02-24 | Stop reason: SDUPTHER

## 2021-01-13 RX ORDER — LORATADINE AND PSEUDOEPHEDRINE SULFATE 10; 240 MG/1; MG/1
1 TABLET, EXTENDED RELEASE ORAL DAILY
Qty: 90 TAB | Refills: 1 | Status: SHIPPED | OUTPATIENT
Start: 2021-01-13 | End: 2021-03-31 | Stop reason: SDUPTHER

## 2021-01-13 RX ORDER — FLUTICASONE PROPIONATE 50 MCG
1 SPRAY, SUSPENSION (ML) NASAL DAILY
Qty: 1 BOTTLE | Refills: 3 | Status: SHIPPED | OUTPATIENT
Start: 2021-01-13 | End: 2021-03-03 | Stop reason: SDUPTHER

## 2021-01-13 RX ORDER — HYDROCODONE POLISTIREX AND CHLORPHENIRAMINE POLISTIREX 10; 8 MG/5ML; MG/5ML
1 SUSPENSION, EXTENDED RELEASE ORAL
Qty: 100 ML | Refills: 0 | Status: SHIPPED | OUTPATIENT
Start: 2021-01-13 | End: 2021-01-13 | Stop reason: SDUPTHER

## 2021-01-13 RX ORDER — HYDROCODONE POLISTIREX AND CHLORPHENIRAMINE POLISTIREX 10; 8 MG/5ML; MG/5ML
1 SUSPENSION, EXTENDED RELEASE ORAL
Qty: 100 ML | Refills: 0 | Status: SHIPPED | OUTPATIENT
Start: 2021-01-13 | End: 2021-01-20

## 2021-01-13 NOTE — PATIENT INSTRUCTIONS
10 Things to Do When You Have COVID-19 Stay home. Don't go to school, work, or public areas. And don't use public transportation, ride-shares, or taxis unless you have no choice. Leave your home only if you need to get medical care. But call the doctor's office first so they know you're coming. And wear a cloth face cover. Ask before leaving isolation. Talk with your doctor or other health professional about when it will be safe for you to leave isolation. Wear a cloth face cover when you are around other people. It can help stop the spread of the virus when you cough or sneeze. Limit contact with people in your home. If possible, stay in a separate bedroom and use a separate bathroom. Avoid contact with pets and other animals. If possible, have a friend or family member care for them while you're sick. Cover your mouth and nose with a tissue when you cough or sneeze. Then throw the tissue in the trash right away. Wash your hands often, especially after you cough or sneeze. Use soap and water, and scrub for at least 20 seconds. If soap and water aren't available, use an alcohol-based hand . Don't share personal household items. These include bedding, towels, cups and glasses, and eating utensils. Clean and disinfect your home every day. Use household  or disinfectant wipes or sprays. Take special care to clean things that you grab with your hands. These include doorknobs, remote controls, phones, and handles on your refrigerator and microwave. And don't forget countertops, tabletops, bathrooms, and computer keyboards. Take acetaminophen (Tylenol) to relieve fever and body aches. Read and follow all instructions on the label. Current as of: July 10, 2020               Content Version: 12.6 © 3328-1968 [employer]. Care instructions adapted under license by "[employer]" (which disclaims liability or warranty for this information). If you have questions about a medical condition or this instruction, always ask your healthcare professional. [address] any warranty or liability for your use of this information.

## 2021-01-13 NOTE — PROGRESS NOTES
Efren Mustafa is a 55 y.o. female who was seen by synchronous (real-time) audio-video technology on 1/13/2021 for Concern For COVID-19 (Coronavirus)        Assessment & Plan:   Diagnoses and all orders for this visit:    1. COVID-19-symptomatic treatment with Tussionex for cough and Albuterol inhaler prn rxd; advised on how long to quarantine and to go to Er if shortness of breath ensues    2. Allergic rhinitis, unspecified seasonality, unspecified trigger-mostly seasonal  -     Claritin-D 24 Hour  mg per tablet; Take 1 Tab by mouth daily. -     fluticasone propionate (FLONASE) 50 mcg/actuation nasal spray; 1 [place] by Both Nostrils route daily. Indications: inflammation of the nose due to an allergy      3. Vit D def-s/p rx; check level next visit      Follow-up and Dispositions    · Return if symptoms worsen or fail to improve, otherwise RTC for previous appt. 712  Subjective:     Health Maintenance Due   Topic Date Due    Pneumococcal 0-64 years (1 of 1 - PPSV23) 01[birth year]11/1972    Shingrix Vaccine Age 50> (1 of 2) 01/11/2016    Flu Vaccine (1)-says she got it at work [employer]) 09/01/2020     Dad with Covid-hospitalized last week; she thinks she got it from her dad when she took him to the doctor and he sneezed in the car; apparently dad got it from his [address] [place] [address] Street office at [facility] where they were contacted that one of the nurses tested positive  Patient Tested positive Marquise 8  Sxs? Stuffy nose, small headaches, noticed a bit of difference in breathing but no shortness of breath or wheezing  Denies fever but has a bit of body aches  Denies anosmia or loss of taste       Needs refill on allergy meds-mostly seasonal  Nasal spray for allergies, Claritin-D      Finished rx for Vit D  Prior to Admission medications    Medication Sig Start Date End Date Taking?  Authorizing Provider   Currently off any meds    Was previously on rx vit D  Lab Results   Component Value Date/Time    Vitamin D 25-Hydroxy 30.3 04/27/2017 12:18 PM    VITAMIN D, 25-HYDROXY 15 (L) 01/10/2020 11:43 AM                                                                               Patient Active Problem List    Diagnosis Date Noted    COVID-19 01/13/2021    Overweight (BMI 25.0-29.9) 02/22/2018    Family history of breast cancer in sister 02/20/2018    Acne 04/10/2016    Vitamin D deficiency 03/11/2015    CTS (carpal tunnel syndrome) 03/03/2015    Allergic rhinitis 03/03/2015       ROS    Objective:   No flowsheet data found. General: alert, cooperative, no distress   Mental  status: normal mood, behavior, speech, dress, motor activity, and thought processes, able to follow commands   HENT: NCAT   Neck: no visualized mass   Resp: no respiratory distress   Neuro: no gross deficits   Skin: no discoloration or lesions of concern on visible areas   Psychiatric: normal affect, consistent with stated mood, no evidence of hallucinations     Additional exam findings: We discussed the expected course, resolution and complications of the diagnosis(es) in detail. Medication risks, benefits, costs, interactions, and alternatives were discussed as indicated. I advised her to contact the office if her condition worsens, changes or fails to improve as anticipated. She expressed understanding with the diagnosis(es) and plan. Marcus Rivera, who was evaluated through a patient-initiated, synchronous (real-time) audio-video encounter, and/or her healthcare decision maker, is aware that it is a billable service, with coverage as determined by her insurance carrier. She provided verbal consent to proceed: Yes, and patient identification was verified. It was conducted pursuant to the emergency declaration under the [address] [place] [address] and the [email or web address] and [employer] General Act. A caregiver was present when appropriate.  Ability to conduct physical exam was limited. I was at home. The patient was at home.       Ludin Cardenas MD

## 2021-02-07 DIAGNOSIS — G47.00 INSOMNIA, UNSPECIFIED TYPE: Primary | ICD-10-CM

## 2021-02-07 RX ORDER — TRAZODONE HYDROCHLORIDE 50 MG/1
50 TABLET ORAL
Qty: 30 TAB | Refills: 0 | Status: SHIPPED | OUTPATIENT
Start: 2021-02-07 | End: 2021-03-31

## 2021-02-24 DIAGNOSIS — U07.1 COVID-19: ICD-10-CM

## 2021-02-24 RX ORDER — FLUCONAZOLE 150 MG/1
150 TABLET ORAL DAILY
Qty: 1 TAB | Refills: 0 | Status: SHIPPED | OUTPATIENT
Start: 2021-02-24 | End: 2021-02-25

## 2021-02-24 RX ORDER — ALBUTEROL SULFATE 90 UG/1
2 AEROSOL, METERED RESPIRATORY (INHALATION)
Qty: 1 INHALER | Refills: 0 | Status: SHIPPED | OUTPATIENT
Start: 2021-02-24 | End: 2021-07-26 | Stop reason: SDUPTHER

## 2021-02-24 NOTE — TELEPHONE ENCOUNTER
Patient notified of recommendations.
Patient states that she has taken both of Diflucan pills. She took the second on Sunday. She complains of continuing discharge. Please advise.
Patient would like to discuss medicine prescribed that she is taking.
No

## 2021-02-24 NOTE — TELEPHONE ENCOUNTER
Patient is having symptoms of a yeast infection. She has an appointment on 3/1, but would like something now.

## 2021-03-03 ENCOUNTER — OFFICE VISIT (OUTPATIENT)
Dept: FAMILY MEDICINE CLINIC | Age: 56
End: 2021-03-03
Payer: COMMERCIAL

## 2021-03-03 VITALS
RESPIRATION RATE: 18 BRPM | BODY MASS INDEX: 25.84 KG/M2 | WEIGHT: 165 LBS | SYSTOLIC BLOOD PRESSURE: 120 MMHG | DIASTOLIC BLOOD PRESSURE: 78 MMHG | OXYGEN SATURATION: 96 % | HEART RATE: 76 BPM

## 2021-03-03 DIAGNOSIS — B37.31 YEAST VAGINITIS: ICD-10-CM

## 2021-03-03 DIAGNOSIS — J30.9 ALLERGIC RHINITIS, UNSPECIFIED SEASONALITY, UNSPECIFIED TRIGGER: ICD-10-CM

## 2021-03-03 DIAGNOSIS — Z83.3 FAMILY HISTORY OF DIABETES MELLITUS (DM): ICD-10-CM

## 2021-03-03 DIAGNOSIS — E55.9 VITAMIN D DEFICIENCY: ICD-10-CM

## 2021-03-03 DIAGNOSIS — F51.02 ADJUSTMENT INSOMNIA: ICD-10-CM

## 2021-03-03 DIAGNOSIS — U07.1 COVID-19: ICD-10-CM

## 2021-03-03 DIAGNOSIS — R63.4 WEIGHT LOSS: ICD-10-CM

## 2021-03-03 DIAGNOSIS — F43.21 GRIEF: Primary | ICD-10-CM

## 2021-03-03 PROCEDURE — 99214 OFFICE O/P EST MOD 30 MIN: CPT | Performed by: INTERNAL MEDICINE

## 2021-03-03 RX ORDER — FLUCONAZOLE 150 MG/1
TABLET ORAL
Qty: 2 TAB | Refills: 1 | Status: SHIPPED | OUTPATIENT
Start: 2021-03-03 | End: 2021-03-31

## 2021-03-03 RX ORDER — FLUTICASONE PROPIONATE 50 MCG
1 SPRAY, SUSPENSION (ML) NASAL DAILY
Qty: 1 BOTTLE | Refills: 3 | Status: SHIPPED | OUTPATIENT
Start: 2021-03-03 | End: 2021-07-26 | Stop reason: SDUPTHER

## 2021-03-03 RX ORDER — ZOLPIDEM TARTRATE 5 MG/1
5 TABLET ORAL
Qty: 30 TAB | Refills: 1 | Status: SHIPPED | OUTPATIENT
Start: 2021-03-03 | End: 2022-04-04 | Stop reason: DRUGHIGH

## 2021-03-03 NOTE — PROGRESS NOTES
Chief Complaint   Patient presents with    Follow Up Chronic Condition     Patient not fasting    Elevated Blood Pressure    Yeast Infection     1. Have you been to the ER, urgent care clinic since your last visit? Hospitalized since your last visit? No    2. Have you seen or consulted any other health care providers outside of the [address] [place] [address] since your last visit? Include any pap smears or colon screening.  No     Health Maintenance Due   Topic    Hepatitis C Screening     Pneumococcal 0-64 years (1 of 1 - PPSV23)    COVID-19 Vaccine (1 of 2)    Shingrix Vaccine Age 50> (1 of 2)

## 2021-03-03 NOTE — PROGRESS NOTES
Chief Complaint   Patient presents with    Follow Up Chronic Condition     Patient not fasting    Elevated Blood Pressure    Yeast Infection       Pt is a 55y.o. year old female who presents for follow up of her chronic medical problems    Concerned about BP being high -fluctuating up and down  Dad passed away from Covid recently    134/80    Fasting? Had a boiled egg  Will do labs today-    Both parents had HTN, dad had DM      BP Readings from Last 3 Encounters:   03/03/21 120/78   01/10/20 129/77   12/23/19 123/67   Reassured BP is normal; taken at least 4x today during her visit-twice by me manually done and 2x by my nurse with the automatic BP machine    Wt Readings from Last 3 Encounters:   03/03/21 165 lb (74.8 kg)   01/10/20 166 lb (75.3 kg)   12/23/19 167 lb 4.8 oz (75.9 kg)     Needs another pill at least for yeast infection     Proventil helps her allergies  ROS:    Pt denies: Wt loss, Fever/Chills, HA, Visual changes, Fatigue, Chest pain, SOB, JEROME, Abd pain, N/V/D/C, Blood in stool or urine, Edema. Pertinent positive as above in HPI. All others were negative    Patient Active Problem List   Diagnosis Code    CTS (carpal tunnel syndrome) G56.00    Allergic rhinitis J30.9    Vitamin D deficiency E55.9    Acne L70.9    Family history of breast cancer in sister Z80.2    Overweight (BMI 25.0-29. 9) E66.3    COVID-19 U07.1       History reviewed. No pertinent past medical history. Current Outpatient Medications   Medication Sig Dispense Refill    albuterol (PROVENTIL HFA, VENTOLIN HFA, PROAIR HFA) 90 mcg/actuation inhaler Take 2 Puffs by inhalation every six (6) hours as needed for Wheezing. 1 Inhaler 0    traZODone (DESYREL) 50 mg tablet Take 1 Tab by mouth nightly as needed for Sleep. 30 Tab 0    Claritin-D 24 Hour  mg per tablet Take 1 Tab by mouth daily. 90 Tab 1    fluticasone propionate (FLONASE) 50 mcg/actuation nasal spray 1 [place] by Both Nostrils route daily.  Indications: inflammation of the nose due to an allergy 1 Bottle 3       Social History     Tobacco Use   Smoking Status Current Some Day Smoker    Types: Cigarettes   Smokeless Tobacco Former User       No Known Allergies    Patient Labs were reviewed: yes      Patient Past Records were reviewed:  yes        Objective:     Vitals:    03/03/21 1357   BP: 120/78   Pulse: 76   Resp: 18   SpO2: 96%   Weight: 165 lb (74.8 kg)     Body mass index is 25.84 kg/m². Exam:   Appearance: alert, well appearing,  oriented to person, place, and time, acyanotic, in no respiratory distress and well hydrated. HEENT:  NC/AT, pink conj, anicteric sclerae  Neck:  No cervical lymphadenopathy, no JVD, no thyromegaly, no carotid bruit  Heart:  RRR without M/R/G  Lungs:  CTAB, no rhonchi, rales, or wheezes with good air exchange   Abdomen:  Non-tender, pos bowel sounds, no hepatosplenomegaly  Ext:  No C/C/E    Skin: no rash  Neuro: no lateralizing signs, CNs II-XII intact      Assessment/ Plan:   Diagnoses and all orders for this visit:    1. Grief-reassured; advised to give her self at least 6 months which is the norm    2. Adjustment insomnia-did not  Trazodone rx; advised to take Ambien sparingly and re possible side effects including temporary amnesia  -     zolpidem (AMBIEN) 5 mg tablet; Take 1 Tab by mouth nightly as needed for Sleep. Max Daily Amount: 5 mg. 3. Allergic rhinitis, unspecified seasonality, unspecified trigger  -     fluticasone propionate (FLONASE) 50 mcg/actuation nasal spray; 1 [place] by Both Nostrils route daily. Indications: inflammation of the nose due to an allergy    4. Yeast vaginitis  -     METABOLIC PANEL, COMPREHENSIVE; Future  -     fluconazole (DIFLUCAN) 150 mg tablet; Take 1 at symptom onset; may take another one after 72 hrs if symptoms persist as needed for vaginal infection    5. Vitamin D deficiency-on OTC vit D  -     VITAMIN D, 25 HYDROXY; Future    6.  Weight loss-?related to grief, but will rule out other possible causes  -     CBC WITH AUTOMATED DIFF; Future  -     METABOLIC PANEL, COMPREHENSIVE; Future  -     TSH 3RD GENERATION; Future    7. Family history of diabetes mellitus (DM)  -     LIPID PANEL; Future  -     HEMOGLOBIN A1C WITH EAG; Future    8. Covid 19-had mild sxs; advised she still needs to get the vaccine    Follow-up and Dispositions    · Return if symptoms worsen or fail to improve. I have discussed the diagnosis with the patient and the intended plan as seen in the above orders. The patient has received an After-Visit Summary and questions were answered concerning future plans. Medication Side Effects and Warnings were discussed with patient: yes    Patient verbalized understanding of above instructions.     Magda Beckford MD  Internal Medicine  [facility]

## 2021-03-03 NOTE — PATIENT INSTRUCTIONS
Grief (Actual/Anticipated): Care Instructions  Your Care Instructions     Grief is your emotional reaction to a major loss. The words \"sorrow\" and \"heartache\" often are used to describe feelings of grief. You feel grief when you lose a beloved person, pet, place, or thing. It is also natural to feel grief when you lose a valued way of life, such as a job, marriage, or good health. You may begin to grieve before a loss occurs. You may grieve for a loved one who is sick and dying. Children and adults often feel the pain of loss before a big move or divorce. This type of grief helps you get ready for a loss. Grief is different for each person. There is no \"normal\" or \"expected\" period of time for grieving. Some people adjust to their loss within a couple of months. Others may take 2 years or longer, especially if their lives were changed a lot or if the loss was sudden and shocking. Grieving can cause problems such as headaches, loss of appetite, and trouble with thinking or sleeping. You may withdraw from friends and family and behave in ways that are unusual for you. Grief may cause you to question your beliefs and views about life. Grief is natural and does not require medical treatment. But if you have trouble sleeping, it may help to take sleeping pills for a short time. It may help to talk with people who have been through or are going through similar losses. You may also want to talk to a counselor about your feelings. Talking about your loss, sharing your cares and concerns, and getting support from others are important parts of healthy grieving. Follow-up care is a key part of your treatment and safety. Be sure to make and go to all appointments, and call your doctor if you are having problems. It's also a good idea to know your test results and keep a list of the medicines you take. How can you care for yourself at home? · Get enough sleep. Your mind helps make sense of your life while you sleep. Missing sleep can lead to illness and make it harder for you to deal with your grief. · Eat healthy foods. Try to avoid eating only foods that give you comfort. Ask someone to join you for a meal if you do not like eating alone. Consider taking a multivitamin every day. · Get some exercise every day. Even a walk can help you deal with your grief. Other exercises, such as yoga, can also help you manage stress. · Comfort yourself. Take time to look at photos or use special items that make you feel better. · Stay involved in your life. Do not withdraw from the activities you enjoy. People you know at work, Temple, clubs, or other groups can help you get through your period of grief. · Think about joining a support group to help you deal with your grief. There are many support groups to help people recover from grief. When should you call for help? Call 911 anytime you think you may need emergency care. For example, call if:    · You feel you cannot stop from hurting yourself or someone else. Watch closely for changes in your health, and be sure to contact your doctor if:    · You think you may be depressed.     · You do not get better as expected. Where can you learn more? Go to http://www.gray.com/  Enter H249 in the search box to learn more about \"Grief (Actual/Anticipated): Care Instructions. \"  Current as of: December 9, 2019               Content Version: 12.6  © 8558-7436 [employer]. Care instructions adapted under license by [employer] (which disclaims liability or warranty for this information). If you have questions about a medical condition or this instruction, always ask your healthcare professional. [address] any warranty or liability for your use of this information.

## 2021-03-05 DIAGNOSIS — E55.9 VITAMIN D DEFICIENCY: Primary | ICD-10-CM

## 2021-03-05 LAB
25(OH)D3 SERPL-MCNC: 17 NG/ML (ref 30–100)
ALB/GLOBRATIO, 58C: 1.3 (CALC) (ref 1–2.5)
ALBUMIN SERPL-MCNC: 4.3 G/DL (ref 3.6–5.1)
ALKALINE PHOSPHATASE, TOTAL, 25002000: 90 U/L (ref 37–153)
ALT SERPL-CCNC: 13 U/L (ref 6–29)
AST SERPL W P-5'-P-CCNC: 12 U/L (ref 10–35)
BASOPHILS # BLD: 28 CELLS/UL (ref 0–200)
BASOPHILS NFR BLD: 0.5 %
BILIRUB SERPL-MCNC: 0.7 MG/DL (ref 0.2–1.2)
BUN SERPL-MCNC: 11 MG/DL (ref 7–25)
BUN/CREATININE RATIO,BUCR: ABNORMAL (CALC) (ref 6–22)
CALCIUM SERPL-MCNC: 10 MG/DL (ref 8.6–10.4)
CHLORIDE SERPL-SCNC: 103 MMOL/L (ref 98–110)
CHOL/HDL RATIO,CHHDX: 2.8 (CALC)
CHOLEST SERPL-MCNC: 211 MG/DL
CO2 SERPL-SCNC: 28 MMOL/L (ref 20–32)
CREAT SERPL-MCNC: 0.7 MG/DL (ref 0.5–1.05)
EAG (MG/DL),9916804: 120 (CALC)
EAG (MMOL/L),9916805: 6.6 (CALC)
EOSINOPHIL # BLD: 90 CELLS/UL (ref 15–500)
EOSINOPHIL NFR BLD: 1.6 %
ERYTHROCYTE [DISTWIDTH] IN BLOOD BY AUTOMATED COUNT: 13.4 % (ref 11–15)
GLOBULIN,GLOB: 3.4 G/DL (CALC) (ref 1.9–3.7)
GLUCOSE SERPL-MCNC: 107 MG/DL (ref 65–99)
HBA1C MFR BLD HPLC: 5.8 % OF TOTAL HGB
HCT VFR BLD AUTO: 43 % (ref 35–45)
HDLC SERPL-MCNC: 75 MG/DL
HGB BLD-MCNC: 13.8 G/DL (ref 11.7–15.5)
LDL-CHOLESTEROL: 117 MG/DL (CALC)
LYMPHOCYTES # BLD: 3724 CELLS/UL (ref 850–3900)
LYMPHOCYTES NFR BLD: 66.5 %
MCH RBC QN AUTO: 26.6 PG (ref 27–33)
MCHC RBC AUTO-ENTMCNC: 32.1 G/DL (ref 32–36)
MCV RBC AUTO: 83 FL (ref 80–100)
MONOCYTES # BLD: 258 CELLS/UL (ref 200–950)
MONOCYTES NFR BLD: 4.6 %
NEUTROPHILS # BLD AUTO: 1501 CELLS/UL (ref 1500–7800)
NEUTROPHILS # BLD: 26.8 %
NON-HDL CHOLESTEROL, 011976: 136 MG/DL (CALC)
PLATELET # BLD AUTO: 355 THOUSAND/UL (ref 140–400)
PMV BLD AUTO: 10.2 FL (ref 7.5–12.5)
POTASSIUM SERPL-SCNC: 4.5 MMOL/L (ref 3.5–5.3)
PROT SERPL-MCNC: 7.7 G/DL (ref 6.1–8.1)
RBC # BLD AUTO: 5.18 MILLION/UL (ref 3.8–5.1)
SODIUM SERPL-SCNC: 139 MMOL/L (ref 135–146)
TRIGL SERPL-MCNC: 88 MG/DL (ref ?–150)
TSH SERPL DL<=0.005 MIU/L-ACNC: 1.66 MIU/L
WBC # BLD AUTO: 5.6 THOUSAND/UL (ref 3.8–10.8)

## 2021-03-05 RX ORDER — ERGOCALCIFEROL 1.25 MG/1
50000 CAPSULE ORAL
Qty: 12 CAP | Refills: 1 | Status: SHIPPED | OUTPATIENT
Start: 2021-03-05 | End: 2022-04-07 | Stop reason: SDUPTHER

## 2021-03-05 NOTE — PROGRESS NOTES
pls let patient know-Vit d is low so I will send rx  Labs also showed slightly elevated blood sugar (prediabetes level) as well as slightly elevated cholesterol-repeat in 3 months    Sign her up for My chart ?

## 2021-03-08 ENCOUNTER — TELEPHONE (OUTPATIENT)
Dept: FAMILY MEDICINE CLINIC | Age: 56
End: 2021-03-08

## 2021-03-09 DIAGNOSIS — N39.0 URINARY TRACT INFECTION WITHOUT HEMATURIA, SITE UNSPECIFIED: Primary | ICD-10-CM

## 2021-03-09 RX ORDER — SULFAMETHOXAZOLE AND TRIMETHOPRIM 800; 160 MG/1; MG/1
1 TABLET ORAL 2 TIMES DAILY
Qty: 10 TAB | Refills: 0 | Status: SHIPPED | OUTPATIENT
Start: 2021-03-09 | End: 2021-03-14

## 2021-03-31 ENCOUNTER — OFFICE VISIT (OUTPATIENT)
Dept: FAMILY MEDICINE CLINIC | Age: 56
End: 2021-03-31
Payer: COMMERCIAL

## 2021-03-31 VITALS
RESPIRATION RATE: 18 BRPM | WEIGHT: 167 LBS | DIASTOLIC BLOOD PRESSURE: 70 MMHG | BODY MASS INDEX: 26.84 KG/M2 | HEIGHT: 66 IN | SYSTOLIC BLOOD PRESSURE: 120 MMHG | HEART RATE: 66 BPM

## 2021-03-31 DIAGNOSIS — Z00.00 WELL WOMAN EXAM (NO GYNECOLOGICAL EXAM): Primary | ICD-10-CM

## 2021-03-31 DIAGNOSIS — R73.01 IMPAIRED FASTING GLUCOSE: ICD-10-CM

## 2021-03-31 DIAGNOSIS — N62 MACROMASTIA: ICD-10-CM

## 2021-03-31 DIAGNOSIS — N39.0 URINARY TRACT INFECTION WITHOUT HEMATURIA, SITE UNSPECIFIED: ICD-10-CM

## 2021-03-31 DIAGNOSIS — J30.9 ALLERGIC RHINITIS, UNSPECIFIED SEASONALITY, UNSPECIFIED TRIGGER: ICD-10-CM

## 2021-03-31 PROCEDURE — 99396 PREV VISIT EST AGE 40-64: CPT | Performed by: INTERNAL MEDICINE

## 2021-03-31 RX ORDER — LORATADINE AND PSEUDOEPHEDRINE SULFATE 10; 240 MG/1; MG/1
1 TABLET, EXTENDED RELEASE ORAL DAILY
Qty: 90 TAB | Refills: 1 | Status: SHIPPED | OUTPATIENT
Start: 2021-03-31 | End: 2021-07-26 | Stop reason: SDUPTHER

## 2021-03-31 NOTE — PROGRESS NOTES
Chief Complaint   Patient presents with    Follow Up Chronic Condition    Bladder Infection    Results     labs    Other     Hot flashes    Annual Wellness Visit       Pt is a 55y.o. year old female who presents for her annual wellness visit/ follow up of her chronic medical problems    Health Maintenance Due   Topic Date Due    Hepatitis C Screening -with next labs Never done    Pneumococcal 0-64 years (1 of 1 - PPSV23) Never done       Treated for UTI-wants to be sure everything is ok  Bad break up so wants to be tested    Wt Readings from Last 3 Encounters:   03/31/21 167 lb (75.8 kg)   03/03/21 165 lb (74.8 kg)   01/10/20 166 lb (75.3 kg)       Did not go to Plastic surgery prev for bilat breast heaviness  Shoulder straps are getting deeper  Shoulder painful  No rash under the breast    Lab Results   Component Value Date/Time    Glucose 107 (H) 03/04/2021 10:23 AM       Lab Results   Component Value Date/Time    Cholesterol, total 211 (H) 03/04/2021 10:23 AM    HDL Cholesterol 75 03/04/2021 10:23 AM    LDL-CHOLESTEROL 117 (H) 03/04/2021 10:23 AM    LDL, calculated 81.4 04/27/2017 12:18 PM    VLDL, calculated 17.6 04/27/2017 12:18 PM    Triglyceride 88 03/04/2021 10:23 AM    CHOL/HDL Ratio 2.3 04/27/2017 12:18 PM    Cholesterol/HDL ratio 2.8 03/04/2021 10:23 AM   no need for med    Due for colonoscopy-had a polyp at age 49    Allergies-cough/and phlegm  Already had covid shot  Claritin for allergies this time of the year  ROS:    Pt denies: Wt loss, Fever/Chills, HA, Visual changes, Fatigue, Chest pain, SOB, JEROME, Abd pain, N/V/D/C, Blood in stool or urine, Edema. Pertinent positive as above in HPI. All others were negative    Patient Active Problem List   Diagnosis Code    CTS (carpal tunnel syndrome) G56.00    Allergic rhinitis J30.9    Vitamin D deficiency E55.9    Acne L70.9    Family history of breast cancer in sister Z80.2    Overweight (BMI 25.0-29. 9) E66.3    COVID-19 U07.1       History reviewed. No pertinent past medical history. Current Outpatient Medications   Medication Sig Dispense Refill    Claritin-D 24 Hour  mg per tablet Take 1 Tab by mouth daily. 90 Tab 1    ergocalciferol (ERGOCALCIFEROL) 1,250 mcg (50,000 unit) capsule Take 1 Cap by mouth every seven (7) days. 12 Cap 1    fluticasone propionate (FLONASE) 50 mcg/actuation nasal spray 1 [place] by Both Nostrils route daily. Indications: inflammation of the nose due to an allergy 1 Bottle 3    zolpidem (AMBIEN) 5 mg tablet Take 1 Tab by mouth nightly as needed for Sleep. Max Daily Amount: 5 mg. 30 Tab 1    albuterol (PROVENTIL HFA, VENTOLIN HFA, PROAIR HFA) 90 mcg/actuation inhaler Take 2 Puffs by inhalation every six (6) hours as needed for Wheezing. 1 Inhaler 0       Social History     Tobacco Use   Smoking Status Current Some Day Smoker    Types: Cigarettes   Smokeless Tobacco Former User       No Known Allergies    Patient Labs were reviewed: yes      Patient Past Records were reviewed:  yes        Objective:     Vitals:    03/31/21 1201 03/31/21 1251   BP: 127/86 120/70   Pulse: 66    Resp: 18    Weight: 167 lb (75.8 kg)    Height: 5' 6\" (1.676 m)      Body mass index is 26.95 kg/m². Exam:   Appearance: alert, well appearing,  oriented to person, place, and time, acyanotic, in no respiratory distress and well hydrated. HEENT:  NC/AT, pink conj, anicteric sclerae  Neck:  No cervical lymphadenopathy, no JVD, no thyromegaly, no carotid bruit  Heart:  RRR without M/R/G  Lungs:  CTAB, no rhonchi, rales, or wheezes with good air exchange   Abdomen:  Non-tender, pos bowel sounds, no hepatosplenomegaly  Ext:  No C/C/E    Skin: no rash  Neuro: no lateralizing signs, CNs II-XII intact  Marked indentation on the bra shoulder straps; macromastia    Assessment/ Plan:   Diagnoses and all orders for this visit:    1.  Well woman exam (no gynecological exam)-Advised re: monthly self breast exam, dental prophylaxis Q 6 months, regular exercise, yearly eye exam, daily intake of Ca+D  Follow up colonoscopy already scheduled  mammo up to date  Lipids-elev HDL    2. Macromastia  -     REFERRAL TO PLASTIC SURGERY    3. Allergic rhinitis, unspecified seasonality, unspecified trigger (mostly seasonal)  -     Claritin-D 24 Hour  mg per tablet; Take 1 Tab by mouth daily.    4. Urinary tract infection without hematuria, site unspecified-previously treated  -     CHLAMYDIA/NEISSERIA AMPLIFICATION; Future  -     CULTURE, URINE; Future    5. Impaired fasting glucose-discussed to watch carbs in diet; recheck A1c in 6 months        Follow-up and Dispositions    · Return in about 6 months (around 9/30/2021) for follow up.             I have discussed the diagnosis with the patient and the intended plan as seen in the above orders.  The patient has received an After-Visit Summary and questions were answered concerning future plans.     Medication Side Effects and Warnings were discussed with patient: yes    Patient verbalized understanding of above instructions.    Karen Ramirez MD  Internal Medicine  [facility]

## 2021-03-31 NOTE — PROGRESS NOTES
1. Have you been to the ER, urgent care clinic since your last visit? Hospitalized since your last visit? 2. Have you seen or consulted any other health care providers outside of the [address] [place] [address] since your last visit? Include any pap smears or colon screening.      Chief Complaint   Patient presents with    Follow Up Chronic Condition    Bladder Infection    Results     labs

## 2021-03-31 NOTE — PATIENT INSTRUCTIONS
Breast Reduction: Before Your Surgery What is breast reduction surgery? Breast reduction surgery removes a lot of the breast tissue and skin from the breasts. This reshapes and lifts the breasts and reduces their size. It can also make the dark area around the nipple smaller. Your doctor makes a cut around the dark area and down to the crease under the breast. The doctor then removes extra skin and breast tissue and sews the remaining skin together. After surgery, your breasts will weigh less. But you may have lasting scars on your breasts. And you may have less feeling in your breasts and nipples. Breast reduction may make it hard to breastfeed. You will probably be able to go home the same day. Depending on the type of work you do, you should be able to go back to work or your normal routine in 2 to 3 weeks. The incisions leave scars that usually fade with time. Follow-up care is a key part of your treatment and safety. Be sure to make and go to all appointments, and call your doctor if you are having problems. It's also a good idea to know your test results and keep a list of the medicines you take. How do you prepare for surgery? Surgery can be stressful. This information will help you understand what you can expect. And it will help you safely prepare for surgery. Preparing for surgery 
  · Be sure you have someone to take you home. Anesthesia and pain medicine will make it unsafe for you to drive or get home on your own.  
  · Understand exactly what surgery is planned, along with the risks, benefits, and other options.  
  · If you take aspirin or some other blood thinner, ask your doctor if you should stop taking it before your surgery. Make sure that you understand exactly what your doctor wants you to do. These medicines increase the risk of bleeding.  
  · Tell your doctor ALL the medicines, vitamins, supplements, and herbal remedies you take.  Some may increase the risk of problems during your surgery. Your doctor will tell you if you should stop taking any of them before the surgery and how soon to do it.  
  · Make sure your doctor and the hospital have a copy of your advance directive. If you don't have one, you may want to prepare one. It lets others know your health care wishes. It's a good thing to have before any type of surgery or procedure. What happens on the day of surgery? · Follow the instructions exactly about when to stop eating and drinking. If you don't, your surgery may be canceled. If your doctor told you to take your medicines on the day of surgery, take them with only a sip of water.  
  · Take a bath or shower before you come in for your surgery. Do not apply lotions, perfumes, deodorants, or nail polish.  
  · Do not shave the surgical site yourself.  
  · Take off all jewelry and piercings. And take out contact lenses, if you wear them. At the hospital or surgery center · Bring a picture ID.  
  · Your doctor will use a marker to draw lines on your breasts. He or she will use these lines during surgery to reshape your breasts.  
  · You will be kept comfortable and safe by your anesthesia provider. The anesthesia may make you sleep. Or it may just numb the area being worked on.  
  · The surgery will take about 2 to 4 hours.  
  · You may have drain tubes in your breasts. When should you call your doctor? · You have questions or concerns.  
  · You don't understand how to prepare for your surgery.  
  · You become ill before the surgery (such as fever, flu, or a cold).  
  · You need to reschedule or have changed your mind about having the surgery. Where can you learn more? Go to http://www.gray.com/ Enter E381 in the search box to learn more about \"Breast Reduction: Before Your Surgery. \" Current as of: July 2, 2020               Content Version: 12.6 © 2882-9463 [employer].   
Care instructions adapted under license by [employer] (which disclaims liability or warranty for this information). If you have questions about a medical condition or this instruction, always ask your healthcare professional. [address] any warranty or liability for your use of this information. Learning About Diabetes Food Guidelines Your Care Instructions Meal planning is important to manage diabetes. It helps keep your blood sugar at a target level (which you set with your doctor). You don't have to eat special foods. You can eat what your family eats, including sweets once in a while. But you do have to pay attention to how often you eat and how much you eat of certain foods. You may want to work with a dietitian or a certified diabetes educator (CDE) to help you plan meals and snacks. A dietitian or CDE can also help you lose weight if that is one of your goals. What should you know about eating carbs? Managing the amount of carbohydrate (carbs) you eat is an important part of healthy meals when you have diabetes. Carbohydrate is found in many foods. · Learn which foods have carbs. And learn the amounts of carbs in different foods. ? Bread, cereal, pasta, and rice have about 15 grams of carbs in a serving. A serving is 1 slice of bread (1 ounce), ½ cup of cooked cereal, or 1/3 cup of cooked pasta or rice. ? Fruits have 15 grams of carbs in a serving. A serving is 1 small fresh fruit, such as an apple or orange; ½ of a banana; ½ cup of cooked or canned fruit; ½ cup of fruit juice; 1 cup of melon or raspberries; or 2 tablespoons of dried fruit. ? Milk and no-sugar-added yogurt have 15 grams of carbs in a serving. A serving is 1 cup of milk or 2/3 cup of no-sugar-added yogurt. ? Starchy vegetables have 15 grams of carbs in a serving. A serving is ½ cup of mashed potatoes or sweet potato; 1 cup winter squash; ½ of a small baked potato; ½ cup of cooked beans; or ½ cup cooked corn or green peas.  
· Learn how much carbs to eat each day and at each meal. A dietitian or CDE can teach you how to keep track of the amount of carbs you eat. This is called carbohydrate counting. · If you are not sure how to count carbohydrate grams, use the Plate Method to plan meals. It is a good, quick way to make sure that you have a balanced meal. It also helps you spread carbs throughout the day. ? Divide your plate by types of foods. Put non-starchy vegetables on half the plate, meat or other protein food on one-quarter of the plate, and a grain or starchy vegetable in the final quarter of the plate. To this you can add a small piece of fruit and 1 cup of milk or yogurt, depending on how many carbs you are supposed to eat at a meal. 
· Try to eat about the same amount of carbs at each meal. Do not \"save up\" your daily allowance of carbs to eat at one meal. 
· Proteins have very little or no carbs per serving. Examples of proteins are beef, chicken, turkey, fish, eggs, tofu, cheese, cottage cheese, and peanut butter. A serving size of meat is 3 ounces, which is about the size of a deck of cards. Examples of meat substitute serving sizes (equal to 1 ounce of meat) are 1/4 cup of cottage cheese, 1 egg, 1 tablespoon of peanut butter, and ½ cup of tofu. How can you eat out and still eat healthy? · Learn to estimate the serving sizes of foods that have carbohydrate. If you measure food at home, it will be easier to estimate the amount in a serving of restaurant food. · If the meal you order has too much carbohydrate (such as potatoes, corn, or baked beans), ask to have a low-carbohydrate food instead. Ask for a salad or green vegetables. · If you use insulin, check your blood sugar before and after eating out to help you plan how much to eat in the future. · If you eat more carbohydrate at a meal than you had planned, take a walk or do other exercise. This will help lower your blood sugar. What else should you know?  
· Limit saturated fat, such as the fat from meat and dairy products. This is a healthy choice because people who have diabetes are at higher risk of heart disease. So choose lean cuts of meat and nonfat or low-fat dairy products. Use olive or canola oil instead of butter or shortening when cooking. · Don't skip meals. Your blood sugar may drop too low if you skip meals and take insulin or certain medicines for diabetes. · Check with your doctor before you drink alcohol. Alcohol can cause your blood sugar to drop too low. Alcohol can also cause a bad reaction if you take certain diabetes medicines. Follow-up care is a key part of your treatment and safety. Be sure to make and go to all appointments, and call your doctor if you are having problems. It's also a good idea to know your test results and keep a list of the medicines you take. Where can you learn more? Go to http://www.gray.com/ Enter R231 in the search box to learn more about \"Learning About Diabetes Food Guidelines. \" Current as of: December 20, 2019               Content Version: 12.6 © 8240-1930 [employer]. Care instructions adapted under license by [employer] (which disclaims liability or warranty for this information). If you have questions about a medical condition or this instruction, always ask your healthcare professional. [address] any warranty or liability for your use of this information.

## 2021-04-03 LAB
BACTERIA UR CULT: ABNORMAL
C TRACH RRNA SPEC QL NAA+PROBE: NEGATIVE
N GONORRHOEA RRNA SPEC QL NAA+PROBE: NEGATIVE

## 2021-04-05 ENCOUNTER — TELEPHONE (OUTPATIENT)
Dept: FAMILY MEDICINE CLINIC | Age: 56
End: 2021-04-05

## 2021-04-05 DIAGNOSIS — N39.0 URINARY TRACT INFECTION WITHOUT HEMATURIA, SITE UNSPECIFIED: ICD-10-CM

## 2021-04-05 DIAGNOSIS — N39.0 URINARY TRACT INFECTION WITHOUT HEMATURIA, SITE UNSPECIFIED: Primary | ICD-10-CM

## 2021-04-05 DIAGNOSIS — H10.13 ALLERGIC CONJUNCTIVITIS OF BOTH EYES: Primary | ICD-10-CM

## 2021-04-05 RX ORDER — OLOPATADINE HYDROCHLORIDE 1 MG/ML
2 SOLUTION/ DROPS OPHTHALMIC 2 TIMES DAILY
Qty: 1 BOTTLE | Refills: 1 | Status: SHIPPED
Start: 2021-04-05 | End: 2021-07-26

## 2021-04-05 RX ORDER — NITROFURANTOIN 25; 75 MG/1; MG/1
100 CAPSULE ORAL 2 TIMES DAILY
Qty: 14 CAP | Refills: 0 | Status: SHIPPED | OUTPATIENT
Start: 2021-04-05 | End: 2021-04-12

## 2021-04-05 NOTE — TELEPHONE ENCOUNTER
When I call patient to inform of lab results patient wanted for me to remind you to send something for her red eyes to pharmacy as discussed at visit. Also patient would like a urine test ordered to re test urine after she has completed her Nitrofurantoin.

## 2021-04-05 NOTE — PROGRESS NOTES
Pls let patient know; urine test showed she still has a UTI so I am sending rx to her pharmacy  And to let her know that test for STD was negative

## 2021-04-29 LAB
RESULT, 75002000: NO GROWTH
SOURCE, 75000000: NORMAL
STATUS: NORMAL

## 2021-06-14 ENCOUNTER — VIRTUAL VISIT (OUTPATIENT)
Dept: FAMILY MEDICINE CLINIC | Age: 56
End: 2021-06-14

## 2021-07-26 ENCOUNTER — OFFICE VISIT (OUTPATIENT)
Dept: FAMILY MEDICINE CLINIC | Age: 56
End: 2021-07-26
Payer: COMMERCIAL

## 2021-07-26 VITALS
HEIGHT: 66 IN | RESPIRATION RATE: 16 BRPM | SYSTOLIC BLOOD PRESSURE: 136 MMHG | HEART RATE: 76 BPM | TEMPERATURE: 97.8 F | DIASTOLIC BLOOD PRESSURE: 78 MMHG | OXYGEN SATURATION: 100 % | WEIGHT: 165.5 LBS | BODY MASS INDEX: 26.6 KG/M2

## 2021-07-26 DIAGNOSIS — Z86.16 HISTORY OF COVID-19: ICD-10-CM

## 2021-07-26 DIAGNOSIS — H10.13 ALLERGIC CONJUNCTIVITIS OF BOTH EYES: ICD-10-CM

## 2021-07-26 DIAGNOSIS — J30.9 ALLERGIC RHINITIS, UNSPECIFIED SEASONALITY, UNSPECIFIED TRIGGER: ICD-10-CM

## 2021-07-26 DIAGNOSIS — M54.2 NECK PAIN: ICD-10-CM

## 2021-07-26 DIAGNOSIS — G44.309 POST-CONCUSSION HEADACHE: Primary | ICD-10-CM

## 2021-07-26 PROCEDURE — 99214 OFFICE O/P EST MOD 30 MIN: CPT | Performed by: INTERNAL MEDICINE

## 2021-07-26 RX ORDER — CYCLOBENZAPRINE HCL 10 MG
10 TABLET ORAL
Qty: 30 TABLET | Refills: 3 | Status: SHIPPED | OUTPATIENT
Start: 2021-07-26

## 2021-07-26 RX ORDER — FLUTICASONE PROPIONATE 50 MCG
1 SPRAY, SUSPENSION (ML) NASAL DAILY
Qty: 1 BOTTLE | Refills: 3 | Status: SHIPPED | OUTPATIENT
Start: 2021-07-26 | End: 2022-04-04 | Stop reason: SDUPTHER

## 2021-07-26 RX ORDER — LORATADINE AND PSEUDOEPHEDRINE SULFATE 10; 240 MG/1; MG/1
1 TABLET, EXTENDED RELEASE ORAL DAILY
Qty: 90 TABLET | Refills: 1 | Status: SHIPPED | OUTPATIENT
Start: 2021-07-26 | End: 2022-04-04 | Stop reason: SDUPTHER

## 2021-07-26 RX ORDER — ALBUTEROL SULFATE 90 UG/1
2 AEROSOL, METERED RESPIRATORY (INHALATION)
Qty: 1 INHALER | Refills: 1 | Status: SHIPPED | OUTPATIENT
Start: 2021-07-26 | End: 2022-04-04 | Stop reason: SDUPTHER

## 2021-07-26 RX ORDER — AZELASTINE HYDROCHLORIDE 0.5 MG/ML
1 SOLUTION/ DROPS OPHTHALMIC 2 TIMES DAILY
Qty: 6 ML | Refills: 3 | Status: SHIPPED | OUTPATIENT
Start: 2021-07-26 | End: 2022-10-05 | Stop reason: SDUPTHER

## 2021-07-26 NOTE — PROGRESS NOTES
Patient seen for routine follow up c/o headaches and neck pain    1. Have you been to the ER, urgent care clinic since your last visit? Hospitalized since your last visit? No    2. Have you seen or consulted any other health care providers outside of the [address] [place] [address] since your last visit? Include any pap smears or colon screening.  No    Health Maintenance Due   Topic Date Due    Hepatitis C Screening  Never done    Pneumococcal 0-64 years (1 of 2 - PPSV23) Never done    Shingrix Vaccine Age 50> (1 of 2) Never done
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
## 2021-07-29 ENCOUNTER — HOSPITAL ENCOUNTER (OUTPATIENT)
Dept: PHYSICAL THERAPY | Age: 56
Discharge: HOME | End: 2021-07-29
Payer: COMMERCIAL

## 2021-07-29 PROCEDURE — 97110 THERAPEUTIC EXERCISES: CPT

## 2021-07-29 PROCEDURE — 97140 MANUAL THERAPY 1/> REGIONS: CPT

## 2021-07-29 PROCEDURE — 97161 PT EVAL LOW COMPLEX 20 MIN: CPT

## 2021-07-29 NOTE — PROGRESS NOTES
PHYSICAL THERAPY - DAILY TREATMENT NOTE    Patient Name: Maddi Lugo        Date: 2021  : 1965   yes Patient  Verified  Visit #:   1     Insurance: Payor: Claudia Thrasher / Plan: Mohan Plater PPO / Product Type: PPO /      In time: 3:59 Out time: 4:40   Total Treatment Time: 41     Medicare/[facility] Time Tracking (below)   Total Timed Codes (min):  n/a 1:1 Treatment Time:  n/a     TREATMENT AREA =  Neck pain [M54.2]  Post-concussion headache [G44.309]    SUBJECTIVE  Pain Level (on 0 to 10 scale):  3-4  / 10   Medication Changes/New allergies or changes in medical history, any new surgeries or procedures?    no  If yes, update Summary List   Subjective Functional Status/Changes:  []  No changes reported     See POC          OBJECTIVE    See exam on chart for details on objective findings        12 min Therapeutic Exercise:  [x]  See flow sheet   Rationale:      increase ROM, increase strength and improve coordination to improve the patients ability to tolerate sitting and ADL's     10 min Manual Therapy: STM to b/l c/s paraspinals, UT, SOR   Rationale:      decrease pain, increase ROM, increase tissue extensibility and decrease trigger points to improve patient's ability to tolerate sitting and ADL's  The manual therapy interventions were performed at a separate and distinct time from the therapeutic activities interventions.         Billed With/As:   [x] TE   [] TA   [] Neuro   [] Self Care Patient Education: [x] Review HEP    [] Progressed/Changed HEP based on:   [] positioning   [] body mechanics   [] transfers   [] heat/ice application    [] other:      Other Objective/Functional Measures:    See POC  -issued HEP     Post Treatment Pain Level (on 0 to 10) scale:   2   10     ASSESSMENT  Assessment/Changes in Function:     See POC     []  See Progress Note/Recertification   Patient will continue to benefit from skilled PT services to modify and progress therapeutic interventions, address functional mobility deficits, address ROM deficits, address strength deficits, analyze and address soft tissue restrictions, analyze and cue movement patterns, analyze and modify body mechanics/ergonomics, assess and modify postural abnormalities and instruct in home and community integration to attain remaining goals. Progress toward goals / Updated goals:    See POC     PLAN  []  Upgrade activities as tolerated yes Continue plan of care   []  Discharge due to :    []  Other:      Therapist: Vasu Moran PT    Date: 7/29/2021 Time: 2:16 PM     Future Appointments   Date Time Provider Belkis Sherman   8/6/2021  2:00 PM Jhoan Gutierrez [facility]   8/9/2021  2:30 PM Diamond Melissa, PTA [facility]   8/13/2021  2:00 PM Diamond Melissa, PTA [facility]   8/16/2021  2:30 PM Diamond Melissa, PTA [facility]   8/20/2021  2:00 PM Diamond Melissa, PTA [facility]

## 2021-07-29 NOTE — PROGRESS NOTES
[address] [facility] AT [address], [address]  Phone: [phone]   [phone]  PLAN OF CARE / [address] [place] [address] PHYSICAL THERAPY SERVICES  Patient Name: Es Mcbride : 1965   Medical   Diagnosis: Neck pain [M54.2]  Post-concussion headache [G44.309] Treatment Diagnosis: Neck pain [M54.2]  Post-concussion headache [G44.309]   Onset Date: 21     Referral Source: Milla Moya MD Start of [facility]): 2021   Prior Hospitalization: See medical history Provider #: [id number]   Prior Level of Function: Unlimited tolerance for ADL/IADL's, sitting and desk work   Comorbidities: Unremarkable per intake   Medications: Verified on Patient Summary List   The Plan of Care and following information is based on the information from the initial evaluation.   ===========================================================================================  Assessment / key information:  Pt is a 55y.o. year old LHD female with subjective complaints of R neck pain/HA that started after injury while shopping at [employer]. Pt reports 5 sheets of metal were leaning on a wall and blew over striking her on the right side of the head. She did not hit the floor. She did not have immediate pain or seek medical attention. She did try to go to Urgent care later that day, but left 2/2 long wait time. She returned to urgent care ~2 weeks later 2/2 ongoing intermittent neck pain. She reports xray were (+) for muscle spasms. She was seen by PCP who refers for PT. Current pain is rated as 5 to 6/10; localized to R neck. Current functional limitations: prolonged sitting (ie for computer work). FOTO score= 55/100 indicating 45% impairment to functional activities.     Today's evaulation is significant for   POSTURE/OBSERVATION: mild FHP, R scap min elevated with noted R UT tone     ROM CERVICAL flex 55; ext 55; Lat flex R 60, L 60 (R neck tight); ROT R WNL, L WNL. B/l Shoulder A/PROM WNL and pain free   STRENGTH  b/l shoulders grossly 5/5. Poor deep cervical neck flexor endurance 3\" (normal >/=29\" for females)  SPECIAL TESTS: (-) cervical compression/distraction/Spurlings and shoulder impingement. ADDITIONAL FINDINGS: moderate hypertonicity to R>L UT, c/s paraspinals and sub-occipitals. Denies photo/phonophobia, vertigo, tinnitus, difficulty with attention/concentration. Endorses occasional lightheadedness that lasts seconds. Reports HA's occur ~2 x/wk and felt as a vague \"pressure\" to sides of head. These findings are supportive for diagnosis of cervicalgia. Pt will be a good candidate for skilled PT to address these impairments and promote return to normal ADLs and functional mobility for improved quality of life.    ===========================================================================================  Eval Complexity: History LOW Complexity : Zero comorbidities / personal factors that will impact the outcome / POC;  Examination  MEDIUM Complexity : 3 Standardized tests and measures addressing body structure, function, activity limitation and / or participation in recreation ; Presentation MEDIUM Complexity : Evolving with changing characteristics ;   Decision Making MEDIUM Complexity : FOTO score of 26-74; Overall Complexity LOW   Problem List: pain affecting function, decrease ROM, decrease strength, decrease ADL/ functional abilitiies, decrease activity tolerance and decrease flexibility/ joint mobility   Treatment Plan may include any combination of the following: Therapeutic exercise, Therapeutic activities, Neuromuscular re-education, Physical agent/modality, Manual therapy, Patient education and Self Care training  Patient / Family readiness to learn indicated by: asking questions, trying to perform skills and interest  Persons(s) to be included in education: patient (P)  Barriers to Learning/Limitations: None  Measures taken, if barriers to learning:    Patient Goal (s): \"no more tightness to my neck\"   Patient self reported health status: good  Rehabilitation Potential: good   Short Term Goals: To be accomplished in  4  treatments:  1. Pt will be educated in appropriate HEP to decrease pain, increase ROM, increase strength and return pt to PLOF. 2. Pt will report average daily pain </= 4/10 with normal activities for improved work tolerance.  Long Term Goals: To be accomplished in  4  weeks:  1. Pt will improve FOTO score to >/= 66 to demo a significant improvement in functional activity tolerance. 2. Pt will achieve >/= +5 GROC in order to promote increased activity tolerance. 3. Pt will increase deep c/s neck flexor endurance to >/= 10\" for improved postural endurance with sitting activities. Frequency / Duration:   Patient to be seen  1-2  times per week for 4  weeks:  Patient / Caregiver education and instruction: self care, activity modification and exercises  Therapist Signature: Trav Rosado PT Date: 2/10/1610   Certification Period: n/a Time: 2:16 PM   ===========================================================================================  I certify that the above Physical Therapy Services are being furnished while the patient is under my care. I agree with the treatment plan and certify that this therapy is necessary. Physician Signature:        Date:       Time:        Ericka Burroughs MD  Please sign and return to [facility] or you may fax the signed copy to [phone]. Thank you.

## 2021-08-06 ENCOUNTER — APPOINTMENT (OUTPATIENT)
Dept: PHYSICAL THERAPY | Age: 56
End: 2021-08-06
Payer: COMMERCIAL

## 2021-08-09 ENCOUNTER — HOSPITAL ENCOUNTER (OUTPATIENT)
Dept: PHYSICAL THERAPY | Age: 56
Discharge: HOME | End: 2021-08-09
Payer: COMMERCIAL

## 2021-08-09 PROCEDURE — 97110 THERAPEUTIC EXERCISES: CPT

## 2021-08-09 PROCEDURE — 97140 MANUAL THERAPY 1/> REGIONS: CPT

## 2021-08-09 NOTE — PROGRESS NOTES
PHYSICAL THERAPY - DAILY TREATMENT NOTE    Patient Name: Jonelle Wilburn        Date: 2021  : 1965   yes Patient  Verified  Visit #:   2  of     Insurance: Payor: Mary Manzanaress / Plan: Samantha Schmidt PPO / Product Type: PPO /      In time: 2:35 Out time: 3:39   Total Treatment Time: 64     Medicare/[facility] Time Tracking (below)   Total Timed Codes (min):   1:1 Treatment Time:       TREATMENT AREA =  Neck pain [M54.2]  Post-concussion headache [G44.309]    SUBJECTIVE  Pain Level (on 0 to 10 scale):    / 10   Medication Changes/New allergies or changes in medical history, any new surgeries or procedures?    no  If yes, update Summary List   Subjective Functional Status/Changes:  []  No changes reported   I have actually been feeling a little bit better since you all loosened me up the last time I was here and I haven't had the headaches as frequently.            OBJECTIVE  Modalities Rationale:     decrease pain and increase tissue extensibility to improve patient's ability to improve functional abilities    min [] Estim, type/location:                                      []  att     []  unatt     []  w/US     []  w/ice    []  w/heat    min []  Mechanical Traction: type/lbs                   []  pro   []  sup   []  int   []  cont    []  before manual    []  after manual    min []  Ultrasound, settings/location:      min []  Iontophoresis w/ dexamethasone, location:                                               []  take home patch       []  in clinic   10 min []  Ice     [x]  Heat    location/position: Cervical/seated    min []  Vasopneumatic Device, press/temp:    If using vaso (only need to measure limb vaso being performed on)      pre-treatment girth :       post-treatment girth :       measured at (landmark location) :      min []  Other:    [] Skin assessment post-treatment (if applicable):    []  intact    []  redness- no adverse reaction                  []redness  adverse reaction:      39 min Therapeutic Exercise:  [x]  See flow sheet   Rationale:      increase ROM and increase strength to improve the patients ability to improve functional abilities      15 min Manual Therapy: SOR, manual bilateral upper trap stretching with overpressure and STM/tissue mobs to bilateral cervical musculature in sitting    Rationale:      decrease pain, increase ROM, increase tissue extensibility, decrease trigger points and increase postural awareness to improve patient's ability to improve functional mobility   The manual therapy interventions were performed at a separate and distinct time from the therapeutic activities interventions. Billed With/As:   [] TE   [] TA   [] Neuro   [] Self Care Patient Education: [x] Review HEP    [] Progressed/Changed HEP based on:   [] positioning   [] body mechanics   [] transfers   [] heat/ice application    [] other:      Other Objective/Functional Measures:       Post Treatment Pain Level (on 0 to 10) scale:   0  / 10     ASSESSMENT  Assessment/Changes in Function:   Pt tolerated all new therex fairly well upon trial today with chief c/o increased R>L cervical/upper trap region tension since last treatment. Pt reported increased benefit from manual intervention focused to regions. Pt needs the most focus on cervical mobility and postural/scapular strengthening as well as cervicothoracic stabilization. Will continue to progress/advance patient within current POC as tolerated with monitoring symptoms. []  See Progress Note/Recertification   Patient will continue to benefit from skilled PT services to modify and progress therapeutic interventions, address functional mobility deficits, address ROM deficits, address strength deficits, analyze and address soft tissue restrictions, analyze and cue movement patterns, analyze and modify body mechanics/ergonomics, assess and modify postural abnormalities and instruct in home and community integration to attain remaining goals. Progress toward goals / Updated goals: · Short Term Goals: To be accomplished in  4  treatments:  1. Pt will be educated in appropriate HEP to decrease pain, increase ROM, increase strength and return pt to PLOF. 8/9/21: Met   2. Pt will report average daily pain </= 4/10 with normal activities for improved work tolerance.     · Long Term Goals: To be accomplished in  4  weeks:  1. Pt will improve FOTO score to >/= 66 to demo a significant improvement in functional activity tolerance. 2. Pt will achieve >/= +5 GROC in order to promote increased activity tolerance.   3. Pt will increase deep c/s neck flexor endurance to >/= 10\" for improved postural endurance with sitting activities.        PLAN  [x]  Upgrade activities as tolerated yes Continue plan of care   []  Discharge due to :    []  Other:      Therapist: Matthew Olsen PTA    Date: 8/9/2021 Time: 2:53 PM     Future Appointments   Date Time Provider Belkis Sherman   8/13/2021  2:00 PM Ronit Tejada [facility]   8/16/2021  2:30 PM Fanta Lee PTA [facility]   8/20/2021  2:00 PM Fanta Lee PTA [facility]

## 2021-08-13 ENCOUNTER — HOSPITAL ENCOUNTER (OUTPATIENT)
Dept: PHYSICAL THERAPY | Age: 56
Discharge: HOME | End: 2021-08-13
Payer: COMMERCIAL

## 2021-08-13 PROCEDURE — 97140 MANUAL THERAPY 1/> REGIONS: CPT

## 2021-08-13 PROCEDURE — 97110 THERAPEUTIC EXERCISES: CPT

## 2021-08-13 NOTE — PROGRESS NOTES
PHYSICAL THERAPY - DAILY TREATMENT NOTE    Patient Name: Juan Fairbanks        Date: 2021  : 1965   yes Patient  Verified  Visit #:   3   of     Insurance: Payor: Gabino Pennington / Plan: Zachary Anderson PPO / Product Type: PPO /      In time: 2:07 Out time: 3:08   Total Treatment Time: 61     Medicare/[facility] Time Tracking (below)   Total Timed Codes (min):   1:1 Treatment Time:       TREATMENT AREA =  Neck pain [M54.2]  Post-concussion headache [G44.309]    SUBJECTIVE  Pain Level (on 0 to 10 scale):  2  / 10   Medication Changes/New allergies or changes in medical history, any new surgeries or procedures?    no  If yes, update Summary List   Subjective Functional Status/Changes:  []  No changes reported   My neck is feeling pretty good today,youreally worked it out the last time that I was here, but I still feel it a little bit on the right side of my neck. OBJECTIVE    49 min Therapeutic Exercise:  [x]  See flow sheet   Rationale:      increase ROM and increase strength to improve the patients ability to improve functional abilities      12 min Manual Therapy: SOR, bilateral manual upper trap stretching and STM/tissue mobs to bilateral cervical musculature in sitting    Rationale:      decrease pain, increase ROM, increase tissue extensibility, decrease trigger points and increase postural awareness to improve patient's ability to improve functional mobility   The manual therapy interventions were performed at a separate and distinct time from the therapeutic activities interventions.     Billed With/As:   [] TE   [] TA   [] Neuro   [] Self Care Patient Education: [x] Review HEP    [] Progressed/Changed HEP based on:   [] positioning   [] body mechanics   [] transfers   [] heat/ice application    [] other:      Other Objective/Functional Measures:   addition of theraband lower trap walk outs and cervicothoracic stabs with 2 lbs      Post Treatment Pain Level (on 0 to 10) scale:   0  / 10 ASSESSMENT  Assessment/Changes in Function:   Pt was able to advance to addition of theraband lower trap walk outs and cervicothoracic stabs with 2 lbs with min to mod challenge. Pt also reported all symptoms abolished at end of treatment today. Will continue to progress/advance patient within current POC as tolerated with monitoring symptoms. []  See Progress Note/Recertification   Patient will continue to benefit from skilled PT services to modify and progress therapeutic interventions, address functional mobility deficits, address ROM deficits, address strength deficits, analyze and address soft tissue restrictions, analyze and cue movement patterns, analyze and modify body mechanics/ergonomics, assess and modify postural abnormalities and instruct in home and community integration to attain remaining goals. Progress toward goals / Updated goals: · Short Term Goals: To be accomplished in  4  treatments:  1.  Pt will be educated in appropriate HEP to decrease pain, increase ROM, increase strength and return pt to PLOF. 8/9/21: Met   2. Pt will report average daily pain </= 4/10 with normal activities for improved work tolerance.     · Long Term Goals: To be accomplished in  4  weeks:  1. Pt will improve FOTO score to >/= 66 to demo a significant improvement in functional activity tolerance. 2. Pt will achieve >/= +5 GROC in order to promote increased activity tolerance. 3. Pt will increase deep c/s neck flexor endurance to >/= 10\" for improved postural endurance with sitting activities.      PLAN  [x]  Upgrade activities as tolerated yes Continue plan of care   []  Discharge due to :    []  Other:      Therapist: Carmencita Michael PTA    Date: 8/13/2021 Time: 2:04 PM     Future Appointments   Date Time Provider Belkis Sherman   8/16/2021  2:30 PM Brent Levine [facility]   8/20/2021  2:00 PM Lennox Ort, PTA [facility]

## 2021-08-16 ENCOUNTER — HOSPITAL ENCOUNTER (OUTPATIENT)
Dept: PHYSICAL THERAPY | Age: 56
Discharge: HOME | End: 2021-08-16
Payer: COMMERCIAL

## 2021-08-16 PROCEDURE — 97140 MANUAL THERAPY 1/> REGIONS: CPT

## 2021-08-16 PROCEDURE — 97110 THERAPEUTIC EXERCISES: CPT

## 2021-08-16 NOTE — PROGRESS NOTES
PHYSICAL THERAPY - DAILY TREATMENT NOTE    Patient Name: Lisa Manzanares        Date: 2021  : 1965   yes Patient  Verified  Visit #:   4   of     Insurance: Payor: Alesha Chavez / Plan: Bonifacio Culver PPO / Product Type: PPO /      In time: 2:41 Out time: 3:40   Total Treatment Time: 59     Medicare/[facility] Time Tracking (below)   Total Timed Codes (min):   1:1 Treatment Time:       TREATMENT AREA =  Neck pain [M54.2]  Post-concussion headache [G44.309]    SUBJECTIVE  Pain Level (on 0 to 10 scale):   10   Medication Changes/New allergies or changes in medical history, any new surgeries or procedures?    no  If yes, update Summary List   Subjective Functional Status/Changes:  []  No changes reported   My neck feels pretty good today, I think that you have been been working it out over the past few times that I have been coming in. OBJECTIVE    45 min Therapeutic Exercise:  [x]  See flow sheet   Rationale:      increase ROM and increase strength to improve the patients ability to improve functional abilities      14 min Manual Therapy: SOR, bilateral manual upper trap stretching and STM/tissue mobs to bilateral cervical musculature in sitting    Rationale:      decrease pain, increase ROM, increase tissue extensibility, decrease trigger points and increase postural awareness to improve patient's ability to improve functional mobility   The manual therapy interventions were performed at a separate and distinct time from the therapeutic activities interventions.     Billed With/As:   [] TE   [] TA   [] Neuro   [] Self Care Patient Education: [x] Review HEP    [] Progressed/Changed HEP based on:   [] positioning   [] body mechanics   [] transfers   [] heat/ice application    [] other:      Other Objective/Functional Measures:       Post Treatment Pain Level (on 0 to 10) scale:   1  / 10     ASSESSMENT  Assessment/Changes in Function:   Patient reports approximately 75% overall improvement from therapy since initial evaluation. Pt would benefit from advancing to prone scapular strengthening program next treatment. Will continue to progress/advance patient within current POC as tolerated with monitoring symptoms. []  See Progress Note/Recertification   Patient will continue to benefit from skilled PT services to modify and progress therapeutic interventions, address functional mobility deficits, address ROM deficits, address strength deficits, analyze and address soft tissue restrictions, analyze and cue movement patterns, analyze and modify body mechanics/ergonomics, assess and modify postural abnormalities and instruct in home and community integration to attain remaining goals. Progress toward goals / Updated goals: · Short Term Goals: To be accomplished in  4  treatments:  1.  Pt will be educated in appropriate HEP to decrease pain, increase ROM, increase strength and return pt to OF. 8/9/21: Met   2. Pt will report average daily pain </= 4/10 with normal activities for improved work tolerance. 8/16/21: Met, pt reports 1-2/10 daily average pain level over the past week     · Long Term Goals: To be accomplished in  4  weeks:  1. Pt will improve FOTO score to >/= 66 to demo a significant improvement in functional activity tolerance. 2. Pt will achieve >/= +5 GROC in order to promote increased activity tolerance. 3. Pt will increase deep c/s neck flexor endurance to >/= 10\" for improved postural endurance with sitting activities.      PLAN  [x]  Upgrade activities as tolerated yes Continue plan of care   []  Discharge due to :    []  Other:      Therapist: Candance Benne, PTA    Date: 8/16/2021 Time: 2:53 PM     Future Appointments   Date Time Provider Belkis Sherman   8/20/2021  2:00 PM Delores Quiroga [facility]

## 2021-08-20 ENCOUNTER — APPOINTMENT (OUTPATIENT)
Dept: PHYSICAL THERAPY | Age: 56
End: 2021-08-20
Payer: COMMERCIAL

## 2021-08-25 ENCOUNTER — HOSPITAL ENCOUNTER (OUTPATIENT)
Dept: PHYSICAL THERAPY | Age: 56
Discharge: HOME | End: 2021-08-25
Payer: COMMERCIAL

## 2021-08-25 PROCEDURE — 97140 MANUAL THERAPY 1/> REGIONS: CPT

## 2021-08-25 PROCEDURE — 97110 THERAPEUTIC EXERCISES: CPT

## 2021-08-25 NOTE — PROGRESS NOTES
PHYSICAL THERAPY - DAILY TREATMENT NOTE    Patient Name: Chilo Marie        Date: 2021  : 1965   yes Patient  Verified  Visit #:   5   of     Insurance: Payor: Louis Patrick / Plan: Adrienne Kaiser PPO / Product Type: PPO /      In time: 10:00 Out time: 11:01   Total Treatment Time: 61     Medicare/[facility] Time Tracking (below)   Total Timed Codes (min):  51 1:1 Treatment Time:  n/a     TREATMENT AREA =  Neck pain [M54.2]  Post-concussion headache [G44.309]    SUBJECTIVE  Pain Level (on 0 to 10 scale):  2  / 10   Medication Changes/New allergies or changes in medical history, any new surgeries or procedures?    no  If yes, update Summary List   Subjective Functional Status/Changes:  []  No changes reported   Pt c/o increased stress 2/2 mother dx with illness; contributing to increased neck pain. Pt reports ~75% subjective improvement in sx's with ADL's. Pt reports decreased frequency/intensity of HA's; occur ~ 2 x/wk to temporal lobes.        OBJECTIVE  Modalities Rationale:     decrease inflammation and decrease pain to improve patient's ability to change/maintain body positions   min [] Estim, type/location:                                      []  att     []  unatt     []  w/US     []  w/ice    []  w/heat    min []  Mechanical Traction: type/lbs                   []  pro   []  sup   []  int   []  cont    []  before manual    []  after manual    min []  Ultrasound, settings/location:      min []  Iontophoresis w/ dexamethasone, location:                                               []  take home patch       []  in clinic   10 min [x]  Ice     []  Heat    location/position: C/s in reclined long sitting    min []  Vasopneumatic Device, press/temp:        min []  Other:    [] Skin assessment post-treatment (if applicable):    []  intact    []  redness- no adverse reaction                  []redness  adverse reaction:        41 min Therapeutic Exercise:  [x]  See flow sheet   Rationale:      increase ROM and increase strength to improve the patients ability to improve functional abilities      10 min Manual Therapy: SOR, bilateral manual upper trap stretching and STM/tissue mobs to bilateral cervical musculature    Rationale:      decrease pain, increase ROM, increase tissue extensibility, decrease trigger points and increase postural awareness to improve patient's ability to improve functional mobility   The manual therapy interventions were performed at a separate and distinct time from the therapeutic activities interventions. Billed With/As:   [x] TE   [] TA   [] Neuro   [] Self Care Patient Education: [x] Review HEP    [] Progressed/Changed HEP based on:   [] positioning   [] body mechanics   [] transfers   [] heat/ice application    [] other:      Other Objective/Functional Measures:  -UBE for periscap activation  -added prone M,T (deferred rows 2/2 poor form)  -increased reps with horiz. abd  -resumed D2 band flex     Post Treatment Pain Level (on 0 to 10) scale:   0  / 10     ASSESSMENT  Assessment/Changes in Function:   Pt with poor MT activation in standing rows; regressed to prone M, T; pt required heavy mc/vc's for correct form. See PN     []  See Progress Note/Recertification   Patient will continue to benefit from skilled PT services to modify and progress therapeutic interventions, address functional mobility deficits, address ROM deficits, address strength deficits, analyze and address soft tissue restrictions, analyze and cue movement patterns, analyze and modify body mechanics/ergonomics, assess and modify postural abnormalities and instruct in home and community integration to attain remaining goals. Progress toward goals / Updated goals:  See PN     PLAN  [x]  Upgrade activities as tolerated yes Continue plan of care   []  Discharge due to :    []  Other:      Therapist: Fe Littlejohn.  Estefania Spain, PT    Date: 8/25/2021 Time: 11:05 AM      Future Appointments   Date Time Provider Department Center   8/25/2021 10:00 AM Heidy Ward., PT [facility]

## 2021-08-25 NOTE — PROGRESS NOTES
[facility] PHYSICAL THERAPY  [address], [facility] [place], [place], [address] - Phone: [phone]  Fax: [phone]  PROGRESS NOTE  Patient Name: Chely Block : 1965   Treatment/Medical Diagnosis: Neck pain [M54.2]  Post-concussion headache [G44.309]   Referral Source: Lavelle Mo MD     Date of Initial Visit: 21 Attended Visits: 5 Missed Visits: 1     SUMMARY OF TREATMENT  Physical therapy has consisted of therapeutic exercises for increased cervico-thoracic strength, ROM, and flexibility. Manual therapy for decreased muscle hypertonicity and increased ROM/flexibility. Moist heat provided PRN for palliative. Pt education provided regarding HEP. CURRENT STATUS  Pt reports 75% overall improvement in sx since beginning care. Pt reports 4/10 max pain, 2/10 avg pain. Pain made worse with stress. Pt has been able to demonstrate cervical AROM now WNL except for b/l SB'ing limited by UT tension/tone. Improvements: decreased frequency/intensity of neck pain/HA's  Remaining functional limitations: HA's occurring ~2 x/wk to temporal lobes. Objective Measurements:  ROM:  Cervical flex 55, Ext 50, SB R 48, L 34;  ROT R 70, L 75. B/l shoulder AROM WNL and pain free  STRENGTH:  Mid trap 3/5, Cervical retraction Fair; requires extensive cueing. Scapular retraction Poor; requires extensive cueing. FOTO 66/100    Goal/Measure of Progress Goal Met? 1. Pt will be educated in appropriate HEP to decrease pain, increase ROM, increase strength and return pt to PLOF.    Status at last Eval: Issued at eval Current Status: Non-compliant yes   2. Pt will report average daily pain </= 4/10 with normal activities for improved work tolerance. Status at last Eval: Max pain 6/10 Current Status: 4/10 yes   3. Pt will improve FOTO score to >/= 66 to demo a significant improvement in functional activity tolerance.    Status at last Eval: 55 Current Status: 66 yes 4.  Pt will achieve >/= +5 GROC in order to promote increased activity tolerance. Status at last Eval: n/a Current Status: +5 yes   5. Pt will increase deep c/s neck flexor endurance to >/= 10\" for improved postural endurance with sitting activities   Status at last Eval: Poor deep c/s neck flexor endurance 3\" Current Status: 0\"; Cervical retraction Fair; requires extensive cueing no     New Goals to be achieved in __4__  weeks:  1. Pt will demonstrate Good isometric chin tuck for reduced cervical strain and tension HA's  2. Pt will report reduced episodes of HA's to </= 1 x/wk with normal activities. 3. Pt will increase deep c/s neck flexor endurance to >/= 10\" for improved postural endurance with sitting activities. 4. Pt will increase b/l mid traps to at least 4-/5 for improved axial stability with ADLs. RECOMMENDATIONS  Pt will benefit from skilled progression of PT at 2 x/wk for additional 4 weeks to attain LTG's. If you have any questions/comments please contact us directly at [phone]. Thank you for allowing us to assist in the care of your patient. Therapist Signature: Marco Antonio Collins. TRENTON Rosado Date: 8/25/2021     Time: 10:22 AM   NOTE TO PHYSICIAN:  PLEASE COMPLETE THE ORDERS BELOW AND FAX TO   [facility]: [phone]  If you are unable to process this request in 24 hours please contact our office: [phone]    ___ I have read the above report and request that my patient continue as recommended.   ___ I have read the above report and request that my patient continue therapy with the following changes/special instructions:_________________________________________________________   ___ I have read the above report and request that my patient be discharged from therapy.      Physician Signature:        Date:       Time:       Navin De Guzman MD

## 2021-08-30 ENCOUNTER — HOSPITAL ENCOUNTER (OUTPATIENT)
Dept: PHYSICAL THERAPY | Age: 56
Discharge: HOME | End: 2021-08-30
Payer: COMMERCIAL

## 2021-08-30 PROCEDURE — 97140 MANUAL THERAPY 1/> REGIONS: CPT

## 2021-08-30 PROCEDURE — 97110 THERAPEUTIC EXERCISES: CPT

## 2021-08-30 NOTE — PROGRESS NOTES
PHYSICAL THERAPY - DAILY TREATMENT NOTE    Patient Name: Romario Lynne        Date: 2021  : 1965   yes Patient  Verified  Visit #:   6   of   13  Insurance: Payor: Carin Wheat / Plan: Mitchel Hector PPO / Product Type: PPO /      In time: 11:45 Out time: 12:59   Total Treatment Time: 74     Medicare/[employer] Time Tracking (below)   Total Timed Codes (min):  60 1:1 Treatment Time:  n/a     TREATMENT AREA =  Neck pain [M54.2]  Post-concussion headache [G44.309]    SUBJECTIVE  Pain Level (on 0 to 10 scale):  2  / 10   Medication Changes/New allergies or changes in medical history, any new surgeries or procedures?    no  If yes, update Summary List   Subjective Functional Status/Changes:  []  No changes reported   Pt reports no significant changes        OBJECTIVE  Modalities Rationale:     decrease pain and increase tissue extensibility to improve patient's ability to change/maintain body position   min [] Estim, type/location:                                      []  att     []  unatt     []  w/US     []  w/ice    []  w/heat    min []  Mechanical Traction: type/lbs                   []  pro   []  sup   []  int   []  cont    []  before manual    []  after manual    min []  Ultrasound, settings/location:      min []  Iontophoresis w/ dexamethasone, location:                                               []  take home patch       []  in clinic   10 min []  Ice     [x]  Heat    location/position: C/s in reclined sitting    min []  Vasopneumatic Device, press/temp:    If using vaso (only need to measure limb vaso being performed on)      pre-treatment girth :       post-treatment girth :       measured at (landmark location) :      min []  Other:    [x] Skin assessment post-treatment (if applicable):    [x]  intact    []  redness- no adverse reaction                  []redness  adverse reaction:        50 min Therapeutic Exercise:  [x]  See flow sheet (-4 min UBE)   Rationale:      increase ROM and increase strength to improve the patients ability to improve functional abilities      10 min Manual Therapy: SOR, bilateral manual upper trap stretching and STM/tissue mobs to bilateral cervical musculature    Rationale:      decrease pain, increase ROM, increase tissue extensibility, decrease trigger points and increase postural awareness to improve patient's ability to improve functional mobility   The manual therapy interventions were performed at a separate and distinct time from the therapeutic activities interventions. Billed With/As:   [x] TE   [] TA   [] Neuro   [] Self Care Patient Education: [x] Review HEP    [] Progressed/Changed HEP based on:   [] positioning   [] body mechanics   [] transfers   [] heat/ice application    [x] other: updated HEP     Other Objective/Functional Measures:  -UBE for periscap activation  -heavy cues for form with wall push up (+), rows  -mod cues for form with prone T/M and D2 flex  -increased reps with D2 band flex  -added SA punch; heavy cues     Post Treatment Pain Level (on 0 to 10) scale:   1  / 10     ASSESSMENT  Assessment/Changes in Function:   Pt continues to require increased cues/supervision for correct activation of deep c/s mm's and porfirio-scap mm's. Pt was advanced with her HEP and encouraged on compliance. No significant changes yet from PN completed last session     []  See Progress Note/Recertification   Patient will continue to benefit from skilled PT services to modify and progress therapeutic interventions, address functional mobility deficits, address ROM deficits, address strength deficits, analyze and address soft tissue restrictions, analyze and cue movement patterns, analyze and modify body mechanics/ergonomics, assess and modify postural abnormalities and instruct in home and community integration to attain remaining goals. Progress toward goals / Updated goals:    New Goals to be achieved in __4__  weeks:  1.  Pt will demonstrate Good isometric chin tuck for reduced cervical strain and tension HA's  2. Pt will report reduced episodes of HA's to </= 1 x/wk with normal activities. 3. Pt will increase deep c/s neck flexor endurance to >/= 10\" for improved postural endurance with sitting activities. 4. Pt will increase b/l mid traps to at least 4-/5 for improved axial stability with ADLs.       PLAN  [x]  Upgrade activities as tolerated yes Continue plan of care   []  Discharge due to :    []  Other:      Therapist: Sha Dalla, PT    Date: 8/30/2021 Time: 1:06 PM      Future Appointments   Date Time Provider Belkis Sherman   8/30/2021 11:45 AM eLwis Whiteside, PT [facility]   9/2/2021  9:15 AM Dion GANT PT [facility]   9/7/2021 11:00 AM [facility] PT [facility] 2 [facility]   9/10/2021 10:00 AM [facility] PT CHILLED POND 2 [facility]   9/13/2021 11:00 AM Pita Orona [facility]   9/15/2021 11:00 AM Pita Adwoa [facility]   9/20/2021 11:00 AM Pita Adwoa [facility]   9/22/2021 11:00 AM Pita Adwoa [facility]

## 2021-09-02 ENCOUNTER — APPOINTMENT (OUTPATIENT)
Dept: PHYSICAL THERAPY | Age: 56
End: 2021-09-02
Payer: COMMERCIAL

## 2021-09-03 ENCOUNTER — APPOINTMENT (OUTPATIENT)
Dept: PHYSICAL THERAPY | Age: 56
End: 2021-09-03
Payer: COMMERCIAL

## 2021-09-07 ENCOUNTER — HOSPITAL ENCOUNTER (OUTPATIENT)
Dept: PHYSICAL THERAPY | Age: 56
Discharge: HOME | End: 2021-09-07
Payer: COMMERCIAL

## 2021-09-07 PROCEDURE — 97140 MANUAL THERAPY 1/> REGIONS: CPT

## 2021-09-07 PROCEDURE — 97110 THERAPEUTIC EXERCISES: CPT

## 2021-09-07 PROCEDURE — 97112 NEUROMUSCULAR REEDUCATION: CPT

## 2021-09-07 NOTE — PROGRESS NOTES
PT DAILY TREATMENT NOTE     Patient Name: Sosa Brewer  Date:2021  : 1965  [x]  Patient  Verified  Payor: Marnie Butler / Plan: Kali Leblanc PPO / Product Type: PPO /    In time:11:05  Out time:12:07  Total Treatment Time (min): 62  Visit #: 7 of 13    Medicare/[employer] Only   Total Timed Codes (min):  50 1:1 Treatment Time:  n/a       Treatment Area: Neck pain [M54.2]  Post-concussion headache [G44.309]    SUBJECTIVE  Pain Level (0-10 scale): 0  Any medication changes, allergies to medications, adverse drug reactions, diagnosis change, or new procedure performed?: [x] No    [] Yes (see summary sheet for update)  Subjective functional status/changes:   [x] No changes reported  Patient reports no changes since last PT visit    OBJECTIVE    Modality rationale: decrease pain and increase tissue extensibility to improve the patients ability to perform overhead reaching and functional activities with decreased discomfort    Min Type Additional Details    [] Estim:  []Unatt       []IFC  []Premod                        []Other:  []w/ice   []w/heat  Position:   Location:     [] Estim: []Att    []TENS instruct  []NMES                    []Other:  []w/US   []w/ice   []w/heat  Position:  Location:    []  Traction: [] Cervical       []Lumbar                       [] Prone          []Supine                       []Intermittent   []Continuous Lbs:  [] before manual  [] after manual    []  Ultrasound: []Continuous   [] Pulsed                           []1MHz   []3MHz W/cm2:  Location:    []  Iontophoresis with dexamethasone         Location: [] Take home patch   [] In clinic   10' + 2' set up []  Ice     [x]  heat  []  Ice massage  []  Laser   []  Anodyne Position: supine  Location: cervical    []  Laser with stim  []  Other:  Position:  Location:    []  Vasopneumatic Device    []  Right     []  Left  Pre-treatment girth:  Post-treatment girth:  Measured at (location):  Pressure:       [] lo [] med [] hi Temperature: [] lo [] med [] hi   [x] Skin assessment post-treatment:  [x]intact []redness- no adverse reaction    []redness  adverse reaction:       20 min Therapeutic Exercise:  [x] See flow sheet :   Rationale: increase ROM and increase strength to improve the patients ability to perform ADLs with improved shoulder/scapular strength and c/s strength and mobility. 22 min Neuromuscular Re-education:  [x]  See flow sheet :   Rationale: increase ROM, increase strength, improve coordination, improve balance and increase proprioception  to improve the patients ability to perform overhead functional lifts with improved scapular stabilization and neutral cervical posture. 8 min Manual Therapy:  STM to cervical paraspinals, UT/LS musculature, gentle SOR   The manual therapy interventions were performed at a separate and distinct time from the therapeutic activities interventions. Rationale: decrease pain, increase ROM, increase tissue extensibility, decrease trigger points and increase postural awareness to improve ease of ADLs in the home environment. With   [x] TE   [] TA   [x] neuro   [] other: Patient Education: [x] Review HEP    [] Progressed/Changed HEP based on:   [] positioning   [] body mechanics   [] transfers   [] heat/ice application    [] other:      Other Objective/Functional Measures:   See goals. Repetitions progressed for rows/extension, SA slides and push ups with plus as per flow sheet, prone 'M''T'   Resistance progressed for wall clocks    Pain Level (0-10 scale) post treatment: 0    ASSESSMENT/Changes in Function: Skilled verbal cues and skilled tactile cuing provided at shoulders to perform therex with proper body mechanics and without shoulder hiking. Patient with positive response to today's treatment session as patient reports no increased pain post treatment session. Focus of today's treatment on scapular stability.  Overall, the patient is making progress towards therapy goals as patient reports decreased headache frequency (see below). The patient will continue to benefit from continued skilled therapy to progress their treatment program to improve postural awareness and decreased tension headaches, as able. Patient will continue to benefit from skilled PT services to modify and progress therapeutic interventions, address functional mobility deficits, address ROM deficits, address strength deficits, analyze and address soft tissue restrictions, analyze and cue movement patterns, analyze and modify body mechanics/ergonomics, assess and modify postural abnormalities, address imbalance/dizziness and instruct in home and community integration to attain remaining goals. []  See Plan of Care  []  See progress note/recertification  []  See Discharge Summary         Progress towards goals / Updated goals:  1. Pt will demonstrate Good isometric chin tuck for reduced cervical strain and tension HA's  Status at last assessment: new goal; Cervical retraction Fair  Current:   2. Pt will report reduced episodes of HA's to </= 1 x/wk with normal activities. Status at last assessment: HA's occurring ~2 x/wk to temporal lobes. Current: 1-2 x in the last week; progressing (9/7/21)  3. Pt will increase deep c/s neck flexor endurance to >/= 10\" for improved postural endurance with sitting activities. Status at last assessment: 0\"; Cervical retraction Fair; requires extensive cueing  Current:   4. Pt will increase b/l mid traps to at least 4-/5 for improved axial stability with ADLs.   Status at last assessment: Mid trap 3/5  Current:     PLAN  [x]  Upgrade activities as tolerated     [x]  Continue plan of care  []  Update interventions per flow sheet       []  Discharge due to:_  []  Other:_      Jama Angelucci, PT 9/7/2021  12:10 PM    Future Appointments   Date Time Provider Belkis Sherman   9/10/2021 10:00 AM [facility] PT CHILLED POND 2 [facility]   9/13/2021 11:00 AM Ashley Derick Palomino [facility]   9/15/2021 11:00 AM Isidro Mcelroy [facility]   9/20/2021 11:00 AM Isidro Mcelroy [facility]   9/22/2021 11:00 AM Isidro Mcelroy [facility]

## 2021-09-10 ENCOUNTER — APPOINTMENT (OUTPATIENT)
Dept: PHYSICAL THERAPY | Age: 56
End: 2021-09-10
Payer: COMMERCIAL

## 2021-09-15 ENCOUNTER — APPOINTMENT (OUTPATIENT)
Dept: PHYSICAL THERAPY | Age: 56
End: 2021-09-15
Payer: COMMERCIAL

## 2021-09-22 ENCOUNTER — APPOINTMENT (OUTPATIENT)
Dept: PHYSICAL THERAPY | Age: 56
End: 2021-09-22
Payer: COMMERCIAL

## 2021-10-12 NOTE — PROGRESS NOTES
[facility]  [address], [place], [address] - Phone: [phone]  Fax: [phone]  DISCHARGE SUMMARY  Patient Name: Samantha Mera : 1965   Treatment/Medical Diagnosis: Neck pain [M54.2]  Post-concussion headache [G44.309]   Referral Source: Navin De Guzman MD     Date of Initial Visit: 2021 Attended Visits: 7 Missed Visits: 5     SUMMARY OF TREATMENT  Pt attended 7 sessions of PT for the tx of R cervical pain. PT tx consisted of therex, NMRE, manua therapy, and application of MHP in order to improve c/s ROM/mobility, cervicothoracic strength/stability, dec tone and dec pain. In addition pt has been provided with HEP to supplement PT treatments. CURRENT STATUS  Pt had been making steady progress in PT, reporting 75% overall improvement at her last reassessment on 21. Unfortunately, pt was only able to attend 2 additional sessions following that reassessment, before requesting hold from therapy 2' family emergency. Last attended session 2021. Patient has requested DC at this time 2' time/scheduling constraints. Formal reassessment unable to be performed 2' unplanned DC; below goal assessment obtained from last attended daily note. Goal/Measure of Progress Goal Met? 1. Pt will demo good isometric chin tuck for reduced cervical strain and tension HA's   Status at last Eval: fair Current Status: unknown n/a   2. Pt will report reduced episodes of HAs to </= 1x/wk with normal activities   Status at last Eval: ~2x/wk to temporal lobes Current Status: 1-2 in last week (21) no   3. Pt will inc deep c/s neck flexor endurance to >/= 10\" for improved postural endurance with sitting activities   Status at last Eval: 0\"; c/s retraction fair, requires extensive cueing Current Status: unknown n/a     3.   Pt will inc B mid traps to at least 4-/5 for improved axial stability with ADLs   Status at last Eval: 3/5 Current Status: unknown n/a     RECOMMENDATIONS  Discontinue therapy due to lack of attendance; pt request discharge. If you have any questions/comments please contact us directly at [phone]. Thank you for allowing us to assist in the care of your patient.     Therapist Signature: [id number] Lucia Robledo, PT Date: 10/12/2021     Time: 9:56 AM

## 2022-01-17 DIAGNOSIS — L30.9 ECZEMA, UNSPECIFIED TYPE: ICD-10-CM

## 2022-01-18 RX ORDER — DESONIDE 0.5 MG/G
CREAM TOPICAL
Qty: 60 G | Refills: 0 | Status: SHIPPED | OUTPATIENT
Start: 2022-01-18 | End: 2022-02-16

## 2022-02-16 DIAGNOSIS — L30.9 ECZEMA, UNSPECIFIED TYPE: ICD-10-CM

## 2022-02-16 RX ORDER — DESONIDE 0.5 MG/G
CREAM TOPICAL
Qty: 60 G | Refills: 0 | Status: SHIPPED | OUTPATIENT
Start: 2022-02-16 | End: 2022-10-05 | Stop reason: SDUPTHER

## 2022-03-18 PROBLEM — Z80.3 FAMILY HISTORY OF BREAST CANCER IN SISTER: Status: ACTIVE | Noted: 2018-02-20

## 2022-03-19 PROBLEM — E66.3 OVERWEIGHT (BMI 25.0-29.9): Status: ACTIVE | Noted: 2018-02-22

## 2022-03-20 PROBLEM — U07.1 COVID-19: Status: ACTIVE | Noted: 2021-01-13

## 2022-04-04 ENCOUNTER — OFFICE VISIT (OUTPATIENT)
Dept: FAMILY MEDICINE CLINIC | Age: 57
End: 2022-04-04
Payer: COMMERCIAL

## 2022-04-04 VITALS
WEIGHT: 167.3 LBS | DIASTOLIC BLOOD PRESSURE: 74 MMHG | HEIGHT: 66 IN | TEMPERATURE: 97.3 F | RESPIRATION RATE: 16 BRPM | OXYGEN SATURATION: 100 % | HEART RATE: 63 BPM | BODY MASS INDEX: 26.89 KG/M2 | SYSTOLIC BLOOD PRESSURE: 133 MMHG

## 2022-04-04 DIAGNOSIS — Z12.4 SCREENING FOR CERVICAL CANCER: ICD-10-CM

## 2022-04-04 DIAGNOSIS — F51.02 ADJUSTMENT INSOMNIA: ICD-10-CM

## 2022-04-04 DIAGNOSIS — Z00.00 WELL WOMAN EXAM (NO GYNECOLOGICAL EXAM): Primary | ICD-10-CM

## 2022-04-04 DIAGNOSIS — M54.2 NECK PAIN: ICD-10-CM

## 2022-04-04 DIAGNOSIS — M25.69 BACK STIFFNESS: ICD-10-CM

## 2022-04-04 DIAGNOSIS — R73.03 PRE-DIABETES: ICD-10-CM

## 2022-04-04 DIAGNOSIS — Z12.31 ENCOUNTER FOR SCREENING MAMMOGRAM FOR MALIGNANT NEOPLASM OF BREAST: ICD-10-CM

## 2022-04-04 DIAGNOSIS — J30.9 ALLERGIC RHINITIS, UNSPECIFIED SEASONALITY, UNSPECIFIED TRIGGER: ICD-10-CM

## 2022-04-04 DIAGNOSIS — E55.9 VITAMIN D DEFICIENCY: ICD-10-CM

## 2022-04-04 DIAGNOSIS — Z11.59 NEED FOR HEPATITIS C SCREENING TEST: ICD-10-CM

## 2022-04-04 DIAGNOSIS — Z86.16 HISTORY OF COVID-19: ICD-10-CM

## 2022-04-04 PROCEDURE — 99396 PREV VISIT EST AGE 40-64: CPT | Performed by: INTERNAL MEDICINE

## 2022-04-04 RX ORDER — ALBUTEROL SULFATE 90 UG/1
2 AEROSOL, METERED RESPIRATORY (INHALATION)
Qty: 1 EACH | Refills: 1 | Status: SHIPPED | OUTPATIENT
Start: 2022-04-04

## 2022-04-04 RX ORDER — CYCLOBENZAPRINE HCL 10 MG
10 TABLET ORAL
Qty: 30 TABLET | Refills: 3 | Status: CANCELLED | OUTPATIENT
Start: 2022-04-04

## 2022-04-04 RX ORDER — ZOLPIDEM TARTRATE 10 MG/1
10 TABLET ORAL
Qty: 30 TABLET | Refills: 3 | Status: SHIPPED | OUTPATIENT
Start: 2022-04-04 | End: 2022-10-05 | Stop reason: SDUPTHER

## 2022-04-04 RX ORDER — LORATADINE AND PSEUDOEPHEDRINE SULFATE 10; 240 MG/1; MG/1
1 TABLET, EXTENDED RELEASE ORAL DAILY
Qty: 90 TABLET | Refills: 1 | Status: SHIPPED | OUTPATIENT
Start: 2022-04-04 | End: 2022-10-05 | Stop reason: SDUPTHER

## 2022-04-04 RX ORDER — ZOLPIDEM TARTRATE 5 MG/1
5 TABLET ORAL
Qty: 30 TABLET | Refills: 1 | Status: CANCELLED | OUTPATIENT
Start: 2022-04-04

## 2022-04-04 RX ORDER — FLUTICASONE PROPIONATE 50 MCG
1 SPRAY, SUSPENSION (ML) NASAL DAILY
Qty: 3 EACH | Refills: 1 | Status: SHIPPED | OUTPATIENT
Start: 2022-04-04 | End: 2022-10-05 | Stop reason: SDUPTHER

## 2022-04-04 RX ORDER — ERGOCALCIFEROL 1.25 MG/1
50000 CAPSULE ORAL
Qty: 12 CAPSULE | Refills: 1 | Status: CANCELLED | OUTPATIENT
Start: 2022-04-04

## 2022-04-04 NOTE — PROGRESS NOTES
Patient seen for CPE, would like increase dose of Ambien and Flexeril     Health Maintenance Due   Topic Date Due    Hepatitis C Screening  Never done    Pneumococcal 0-64 years (1 of 2 - PPSV23) Never done    Shingrix Vaccine Age 50> (1 of 2) Never done    Breast Cancer Screen Mammogram  10/19/2021    Depression Screen  03/31/2022       1. \"Have you been to the ER, urgent care clinic since your last visit? Hospitalized since your last visit? \" No    2. \"Have you seen or consulted any other health care providers outside of the [address] [place] [address] since your last visit? \" No     3. For patients aged 39-70: Has the patient had a colonoscopy / FIT/ Cologuard? Yes - no Care Gap present      If the patient is female:    4. For patients aged 41-77: Has the patient had a mammogram within the past 2 years? Yes - no Care Gap present      5. For patients aged 21-65: Has the patient had a pap smear?  Yes - no Care Gap present

## 2022-04-04 NOTE — PROGRESS NOTES
Assessment/ Plan:   Diagnoses and all orders for this visit:    1. Well woman exam (no gynecological exam)-Advised re: monthly self breast exam, dental prophylaxis Q 6 months, regular exercise, yearly eye exam, daily intake of Ca+D  -     CBC WITH AUTOMATED DIFF; Future  -     METABOLIC PANEL, COMPREHENSIVE; Future  -     LIPID PANEL; Future  -     [facility] MAMMO BI SCREENING INCL CAD; Future    2. Encounter for screening mammogram for malignant neoplasm of breast  -     [facility] MAMMO BI SCREENING INCL CAD; Future    3. Need for hepatitis C screening test  -     HEPATITIS C AB; Future    4. History of COVID-19  -     albuterol (PROVENTIL HFA, VENTOLIN HFA, PROAIR HFA) 90 mcg/actuation inhaler; Take 2 Puffs by inhalation every six (6) hours as needed for Wheezing. 5. Allergic rhinitis, unspecified seasonality, unspecified trigger  -     Claritin-D 24 Hour  mg per tablet; Take 1 Tablet by mouth daily. -     fluticasone propionate (FLONASE) 50 mcg/actuation nasal spray; 1 [place] by Both Nostrils route daily. Indications: inflammation of the nose due to an allergy    6. Neck pain -s/p PT after incident at [address]    7. Vitamin D deficiency-on rx, await results prior to refill  -     VITAMIN D, 25 HYDROXY; Future    8. Adjustment insomnia-will inc Ambien to 10 mg as the 5 mg does not work most of the time    9. Pre-diabetes  -     HEMOGLOBIN A1C WITH EAG; Future    10. Vague mid back pain with movement-check Xray of the thoracic spine where she feels most of the discomfort    Follow-up and Dispositions    · Return in about 6 months (around 10/4/2022) for follow up.                      Chief Complaint   Patient presents with    Physical    Joint Pain       Pt is a 65y.o. year old female who presents for follow up of her chronic medical problems    Health Maintenance Due   Topic Date Due    Hepatitis C Screening -ordered Never done    Pneumococcal 0-64 years (1 of 2 - PPSV23) Never done    Shingrix Vaccine Age 50> (1 of 2)-will get it at her pharmacy Never done    Breast Cancer Screen Mammogram-says it was done last year 10/19/2021    Depression Screen  03/31/2022    hysterectomy-2016  Colonoscopy done 5/2021-int hemorrhoids otherwise normal, hx of adenomatous polyps so Q 5 yrs  Wt Readings from Last 3 Encounters:   04/04/22 167 lb 4.8 oz (75.9 kg)   07/26/21 165 lb 8 oz (75.1 kg)   03/31/21 167 lb (75.8 kg)       Lab Results   Component Value Date/Time    Cholesterol, total 211 (H) 03/04/2021 10:23 AM    HDL Cholesterol 75 03/04/2021 10:23 AM    LDL-CHOLESTEROL 117 (H) 03/04/2021 10:23 AM    LDL, calculated 81.4 04/27/2017 12:18 PM    VLDL, calculated 17.6 04/27/2017 12:18 PM    Triglyceride 88 03/04/2021 10:23 AM    CHOL/HDL Ratio 2.3 04/27/2017 12:18 PM    Cholesterol/HDL ratio 2.8 03/04/2021 10:23 AM     Discussed ASCVD risk  The 10-year ASCVD risk score (Shikha Lucas, et al., 2013) is: 6.3%    Values used to calculate the score:      Age: 65 years      Sex: Female      Is Non- : Yes      Diabetic: No      Tobacco smoker: Yes      Systolic Blood Pressure: 929 mmHg      Is BP treated: No      HDL Cholesterol: 75 mg/dL      Total Cholesterol: 211 mg/dL    Lab Results   Component Value Date/Time    Hemoglobin A1c 5.8 (H) 03/04/2021 10:23 AM   dad with diabetes      Back stiffness -a few months now, on and off  Carried heavy plants  Flexeril 10 mg which did not help  Bending down and coming up makes her very sore    Concussion from [place] to PT-discharged herself bec mom passed away  Still with tightness on the left neck area  They have not settled yet  Has not talked to a     Ambien 5 mg-keeps her wired up and does not make her sleepy  Some nights cannot sleep when she is thinking; has gotten a lot better      ROS:    Pt denies: Wt loss, Fever/Chills, HA, Visual changes, Fatigue, Chest pain, SOB, JEROME, Abd pain, N/V/D/C, Blood in stool or urine, Edema. Pertinent positive as above in HPI. All others were negative    Patient Active Problem List   Diagnosis Code    CTS (carpal tunnel syndrome) G56.00    Allergic rhinitis J30.9    Vitamin D deficiency E55.9    Acne L70.9    Family history of breast cancer in sister Z80.2    Overweight (BMI 25.0-29. 9) E66.3    COVID-19 U07.1       History reviewed. No pertinent past medical history. Current Outpatient Medications   Medication Sig Dispense Refill    albuterol (PROVENTIL HFA, VENTOLIN HFA, PROAIR HFA) 90 mcg/actuation inhaler Take 2 Puffs by inhalation every six (6) hours as needed for Wheezing. 1 Each 1    Claritin-D 24 Hour  mg per tablet Take 1 Tablet by mouth daily. 90 Tablet 1    fluticasone propionate (FLONASE) 50 mcg/actuation nasal spray 1 [place] by Both Nostrils route daily. Indications: inflammation of the nose due to an allergy 3 Each 1    desonide (TRIDESILON) 0.05 % cream apply to affected area twice daily 60 g 0    azelastine (OPTIVAR) 0.05 % ophthalmic solution Administer 1 Drop to both eyes two (2) times a day. Use in affected eye(s) 6 mL 3    cyclobenzaprine (FLEXERIL) 10 mg tablet Take 1 Tablet by mouth three (3) times daily as needed for Muscle Spasm(s). 30 Tablet 3    ergocalciferol (ERGOCALCIFEROL) 1,250 mcg (50,000 unit) capsule Take 1 Cap by mouth every seven (7) days. 12 Cap 1    zolpidem (AMBIEN) 5 mg tablet Take 1 Tab by mouth nightly as needed for Sleep. Max Daily Amount: 5 mg. 30 Tab 1       Social History     Tobacco Use   Smoking Status Current Some Day Smoker    Types: Cigarettes   Smokeless Tobacco Former User       No Known Allergies    Patient Labs were reviewed: yes    Patient Past Records were reviewed: yes      Objective:     Vitals:    04/04/22 1044   BP: 133/74   Pulse: 63   Resp: 16   Temp: 97.3 °F (36.3 °C)   TempSrc: Temporal   SpO2: 100%   Weight: 167 lb 4.8 oz (75.9 kg)   Height: 5' 6\" (1.676 m)     Body mass index is 27 kg/m².     Exam:   Appearance: alert, well appearing,  oriented to person, place, and time, acyanotic, in no respiratory distress and well hydrated. HEENT:  NC/AT, pink conj, anicteric sclerae  Neck:  No cervical lymphadenopathy, no JVD, no thyromegaly, no carotid bruit  Heart:  RRR without M/R/G  Lungs:  CTAB, no rhonchi, rales, or wheezes with good air exchange   Abdomen:  Non-tender, pos bowel sounds, no hepatosplenomegaly  Ext:  No C/C/E    Skin: no rash  Neuro: no lateralizing signs, CNs II-XII intact  Breast exam: no palpable masses bilaterally, no nipple discharge, no axillary adenopathy, no skin changes  Pelvic exam: NEG, on speculum exam cervix appeared normal, no vaginal canal lesions; PAP done      I have discussed the diagnosis with the patient and the intended plan as seen in the above orders. The patient has received an After-Visit Summary and questions were answered concerning future plans. Medication Side Effects and Warnings were discussed with patient: yes    Patient verbalized understanding of above instructions.     Payton Zhou MD  Internal Medicine  [facility]

## 2022-04-04 NOTE — PATIENT INSTRUCTIONS

## 2022-04-05 LAB
APPEARANCE UR: CLEAR
BILIRUB UR QL STRIP: NEGATIVE
COLOR UR: YELLOW
GLUCOSE UR QL STRIP: NEGATIVE
HGB UR QL STRIP: NEGATIVE
KETONES UR QL STRIP: NEGATIVE
LEUKOCYTE ESTERASE UR QL STRIP: NEGATIVE
MICRO URNS: NORMAL
NITRITE UR QL STRIP: NEGATIVE
PH UR STRIP: 5 [PH] (ref 5–7.5)
PROT UR QL STRIP: NEGATIVE
SP GR UR STRIP: 1.02 (ref 1–1.03)
UROBILINOGEN UR STRIP-MCNC: 0.2 MG/DL (ref 0.2–1)

## 2022-04-06 LAB
25(OH)D3+25(OH)D2 SERPL-MCNC: 28.7 NG/ML (ref 30–100)
ALBUMIN SERPL-MCNC: 4.2 G/DL (ref 3.8–4.9)
ALBUMIN/GLOB SERPL: 1.2 {RATIO} (ref 1.2–2.2)
ALP SERPL-CCNC: 102 IU/L (ref 44–121)
ALT SERPL-CCNC: 17 IU/L (ref 0–32)
AST SERPL-CCNC: 15 IU/L (ref 0–40)
BASOPHILS # BLD AUTO: 0 X10E3/UL (ref 0–0.2)
BASOPHILS NFR BLD AUTO: 0 %
BILIRUB SERPL-MCNC: 0.4 MG/DL (ref 0–1.2)
BUN SERPL-MCNC: 11 MG/DL (ref 6–24)
BUN/CREAT SERPL: 18 (ref 9–23)
CALCIUM SERPL-MCNC: 9.6 MG/DL (ref 8.7–10.2)
CHLORIDE SERPL-SCNC: 100 MMOL/L (ref 96–106)
CHOLEST SERPL-MCNC: 205 MG/DL (ref 100–199)
CO2 SERPL-SCNC: 22 MMOL/L (ref 20–29)
CREAT SERPL-MCNC: 0.61 MG/DL (ref 0.57–1)
EGFR: 105 ML/MIN/1.73
EOSINOPHIL # BLD AUTO: 0.1 X10E3/UL (ref 0–0.4)
EOSINOPHIL NFR BLD AUTO: 2 %
ERYTHROCYTE [DISTWIDTH] IN BLOOD BY AUTOMATED COUNT: 13.7 % (ref 11.7–15.4)
EST. AVERAGE GLUCOSE BLD GHB EST-MCNC: 126 MG/DL
GLOBULIN SER CALC-MCNC: 3.4 G/DL (ref 1.5–4.5)
GLUCOSE SERPL-MCNC: 93 MG/DL (ref 65–99)
HBA1C MFR BLD: 6 % (ref 4.8–5.6)
HCT VFR BLD AUTO: 39.5 % (ref 34–46.6)
HCV AB S/CO SERPL IA: <0.1 S/CO RATIO (ref 0–0.9)
HDLC SERPL-MCNC: 79 MG/DL
HGB BLD-MCNC: 12.9 G/DL (ref 11.1–15.9)
IMM GRANULOCYTES # BLD AUTO: 0 X10E3/UL (ref 0–0.1)
IMM GRANULOCYTES NFR BLD AUTO: 0 %
IMP & REVIEW OF LAB RESULTS: NORMAL
LDLC SERPL CALC-MCNC: 116 MG/DL (ref 0–99)
LYMPHOCYTES # BLD AUTO: 3.4 X10E3/UL (ref 0.7–3.1)
LYMPHOCYTES NFR BLD AUTO: 58 %
MCH RBC QN AUTO: 26.7 PG (ref 26.6–33)
MCHC RBC AUTO-ENTMCNC: 32.7 G/DL (ref 31.5–35.7)
MCV RBC AUTO: 82 FL (ref 79–97)
MONOCYTES # BLD AUTO: 0.5 X10E3/UL (ref 0.1–0.9)
MONOCYTES NFR BLD AUTO: 8 %
NEUTROPHILS # BLD AUTO: 1.9 X10E3/UL (ref 1.4–7)
NEUTROPHILS NFR BLD AUTO: 32 %
PLATELET # BLD AUTO: 358 X10E3/UL (ref 150–450)
POTASSIUM SERPL-SCNC: 4.6 MMOL/L (ref 3.5–5.2)
PROT SERPL-MCNC: 7.6 G/DL (ref 6–8.5)
RBC # BLD AUTO: 4.83 X10E6/UL (ref 3.77–5.28)
SODIUM SERPL-SCNC: 137 MMOL/L (ref 134–144)
TRIGL SERPL-MCNC: 53 MG/DL (ref 0–149)
VLDLC SERPL CALC-MCNC: 10 MG/DL (ref 5–40)
WBC # BLD AUTO: 5.9 X10E3/UL (ref 3.4–10.8)

## 2022-04-07 DIAGNOSIS — E55.9 VITAMIN D DEFICIENCY: ICD-10-CM

## 2022-04-07 LAB
CYTOLOGIST CVX/VAG CYTO: NORMAL
CYTOLOGY CVX/VAG DOC CYTO: NORMAL
CYTOLOGY CVX/VAG DOC THIN PREP: NORMAL
DX ICD CODE: NORMAL
HPV I/H RISK 4 DNA CVX QL PROBE+SIG AMP: NEGATIVE
Lab: NORMAL
OTHER STN SPEC: NORMAL
STAT OF ADQ CVX/VAG CYTO-IMP: NORMAL

## 2022-04-07 RX ORDER — ERGOCALCIFEROL 1.25 MG/1
50000 CAPSULE ORAL
Qty: 12 CAPSULE | Refills: 1 | Status: SHIPPED | OUTPATIENT
Start: 2022-04-07 | End: 2022-10-05 | Stop reason: SDUPTHER

## 2022-04-07 NOTE — PROGRESS NOTES
Pls let patient know-Vit d is still low so I sent rx  Prediabetes-recheck in 6 months, dec carbs in diet  Cholesterol is improved-continue with low fat diet, regular exercise  Urine normal as well as kidney and liver tests            The 10-year ASCVD risk score (Enid Kaiser., et al., 2013) is: 5.9%    Values used to calculate the score:      Age: 65 years      Sex: Female      Is Non- : Yes      Diabetic: No      Tobacco smoker: Yes      Systolic Blood Pressure: 127 mmHg      Is BP treated: No      HDL Cholesterol: 79 mg/dL      Total Cholesterol: 205 mg/dL

## 2022-04-26 DIAGNOSIS — M41.9 SCOLIOSIS OF THORACIC SPINE, UNSPECIFIED SCOLIOSIS TYPE: Primary | ICD-10-CM

## 2022-04-26 DIAGNOSIS — M25.69 BACK STIFFNESS: ICD-10-CM

## 2022-10-05 ENCOUNTER — OFFICE VISIT (OUTPATIENT)
Dept: FAMILY MEDICINE CLINIC | Age: 57
End: 2022-10-05
Payer: COMMERCIAL

## 2022-10-05 VITALS
DIASTOLIC BLOOD PRESSURE: 73 MMHG | HEIGHT: 66 IN | RESPIRATION RATE: 16 BRPM | TEMPERATURE: 98.2 F | OXYGEN SATURATION: 98 % | BODY MASS INDEX: 28 KG/M2 | HEART RATE: 68 BPM | SYSTOLIC BLOOD PRESSURE: 124 MMHG | WEIGHT: 174.2 LBS

## 2022-10-05 DIAGNOSIS — H10.13 ALLERGIC CONJUNCTIVITIS OF BOTH EYES: ICD-10-CM

## 2022-10-05 DIAGNOSIS — L30.9 ECZEMA, UNSPECIFIED TYPE: ICD-10-CM

## 2022-10-05 DIAGNOSIS — F51.02 ADJUSTMENT INSOMNIA: ICD-10-CM

## 2022-10-05 DIAGNOSIS — E55.9 VITAMIN D DEFICIENCY: ICD-10-CM

## 2022-10-05 DIAGNOSIS — E78.5 HYPERLIPIDEMIA, UNSPECIFIED HYPERLIPIDEMIA TYPE: ICD-10-CM

## 2022-10-05 DIAGNOSIS — R73.01 IMPAIRED FASTING GLUCOSE: Primary | ICD-10-CM

## 2022-10-05 DIAGNOSIS — Z80.3 FAMILY HISTORY OF BREAST CANCER IN SISTER: ICD-10-CM

## 2022-10-05 DIAGNOSIS — J30.9 ALLERGIC RHINITIS, UNSPECIFIED SEASONALITY, UNSPECIFIED TRIGGER: ICD-10-CM

## 2022-10-05 LAB — HBA1C MFR BLD HPLC: 5.7 %

## 2022-10-05 PROCEDURE — 83036 HEMOGLOBIN GLYCOSYLATED A1C: CPT | Performed by: INTERNAL MEDICINE

## 2022-10-05 PROCEDURE — 99214 OFFICE O/P EST MOD 30 MIN: CPT | Performed by: INTERNAL MEDICINE

## 2022-10-05 RX ORDER — ERGOCALCIFEROL 1.25 MG/1
50000 CAPSULE ORAL
Qty: 12 CAPSULE | Refills: 1 | Status: SHIPPED | OUTPATIENT
Start: 2022-10-05

## 2022-10-05 RX ORDER — AZELASTINE HYDROCHLORIDE 0.5 MG/ML
1 SOLUTION/ DROPS OPHTHALMIC 2 TIMES DAILY
Qty: 6 ML | Refills: 3 | Status: SHIPPED | OUTPATIENT
Start: 2022-10-05

## 2022-10-05 RX ORDER — ZOLPIDEM TARTRATE 10 MG/1
10 TABLET ORAL
Qty: 30 TABLET | Refills: 3 | Status: SHIPPED | OUTPATIENT
Start: 2022-10-05

## 2022-10-05 RX ORDER — FLUTICASONE PROPIONATE 50 MCG
1 SPRAY, SUSPENSION (ML) NASAL DAILY
Qty: 3 EACH | Refills: 1 | Status: SHIPPED | OUTPATIENT
Start: 2022-10-05

## 2022-10-05 RX ORDER — DESONIDE 0.5 MG/G
CREAM TOPICAL
Qty: 60 G | Refills: 2 | Status: SHIPPED | OUTPATIENT
Start: 2022-10-05

## 2022-10-05 RX ORDER — LORATADINE AND PSEUDOEPHEDRINE SULFATE 10; 240 MG/1; MG/1
1 TABLET, EXTENDED RELEASE ORAL DAILY
Qty: 90 TABLET | Refills: 1 | Status: SHIPPED | OUTPATIENT
Start: 2022-10-05

## 2022-10-05 NOTE — PROGRESS NOTES
Patient seen for routine follow up   Declined vaccine  Health Maintenance Due   Topic Date Due    Pneumococcal 0-64 years (1 - PCV) Never done    Shingrix Vaccine Age 50> (1 of 2) Never done    COVID-19 Vaccine (4 - Booster for Moderna series) 03/09/2022    Flu Vaccine (1) 08/01/2022     1. \"Have you been to the ER, urgent care clinic since your last visit? Hospitalized since your last visit? \" No    2. \"Have you seen or consulted any other health care providers outside of the [address] [place] [address] since your last visit? \" No     3. For patients aged 39-70: Has the patient had a colonoscopy / FIT/ Cologuard? Yes - no Care Gap present      If the patient is female:    4. For patients aged 41-77: Has the patient had a mammogram within the past 2 years? Yes - no Care Gap present      5. For patients aged 21-65: Has the patient had a pap smear?  Yes - no Care Gap present

## 2022-10-05 NOTE — PROGRESS NOTES
Assessment/ Plan:   Diagnoses and all orders for this visit:    1. Impaired fasting glucose-continue with regular exercise and low carb diet  -     AMB POC HEMOGLOBIN A1C    2. Hyperlipidemia, unspecified hyperlipidemia type-low cholesterol diet advised  and will determine ASCVD risk with next labs to see if she will benefit from taking a statin   -     METABOLIC PANEL, COMPREHENSIVE; Future  -     LIPID PANEL; Future    3. Vitamin D deficiency-on rx  -     ergocalciferol (ERGOCALCIFEROL) 1,250 mcg (50,000 unit) capsule; Take 1 Capsule by mouth every seven (7) days. -     VITAMIN D, 25 HYDROXY; Future    4. Allergic rhinitis, unspecified seasonality, unspecified trigger  -     Claritin-D 24 Hour  mg per tablet; Take 1 Tablet by mouth daily. -     fluticasone propionate (FLONASE) 50 mcg/actuation nasal spray; 1 [place] by Both Nostrils route daily. Indications: inflammation of the nose due to an allergy  -     CBC WITH AUTOMATED DIFF; Future    5. Allergic conjunctivitis of both eyes  -     azelastine (OPTIVAR) 0.05 % ophthalmic solution; Administer 1 Drop to both eyes two (2) times a day. Use in affected eye(s)    6. Eczema, unspecified type  -     desonide (TRIDESILON) 0.05 % cream; apply to affected area twice daily    7. Adjustment insomnia  -     zolpidem (AMBIEN) 10 mg tablet; Take 1 Tablet by mouth nightly as needed for Sleep. Max Daily Amount: 10 mg.    8. Family history of breast cancer in sister-per Radiology recommendations, will order US along with mammo bec of dense breasts with ehr next annual mammo; advised to do monthly self breast exams          Follow-up and Dispositions    Return in about 6 months (around 4/5/2023) for physical, follow up.                    Chief Complaint   Patient presents with    Follow Up Chronic Condition       Pt is a 65y.o. year old female who presents for follow up of her chronic medical problems    Health Maintenance Due   Topic Date Due    Pneumococcal 0-64 years (1 - PCV) Never done    Shingrix Vaccine Age 50> (1 of 2) Never done    COVID-19 Vaccine (4 - Booster for Moderna series)- 03/09/2022    Flu Vaccine (1)-will get it at work 08/01/2022        Wt Readings from Last 3 Encounters:   10/05/22 174 lb 3.2 oz (79 kg)   04/04/22 167 lb 4.8 oz (75.9 kg)   07/26/21 165 lb 8 oz (75.1 kg)    Feels that she gained weight    Last Point of Care HGB A1C  Hemoglobin A1c (POC)   Date Value Ref Range Status   10/05/2022 5.7 % Final     Dad had DM    Not fasting    IMPRESSION:   There is no mammographic evidence of malignancy. A 1 year screening mammogram is   recommended. Sher Guardado calculations report this patient's 10-year risk for developing   breast cancer at 5.5% and lifetime risk at 16.5%. The patient was notified of the results by mail. Given dense breast tissue, supplemental whole breast ultrasound could be   considered. Sister with breast cancer      ROS:    Pt denies: Wt loss, Fever/Chills, HA, Visual changes, Fatigue, Chest pain, SOB, JEROME, Abd pain, N/V/D/C, Blood in stool or urine, Edema. Pertinent positive as above in HPI. All others were negative    Patient Active Problem List   Diagnosis Code    CTS (carpal tunnel syndrome) G56.00    Allergic rhinitis J30.9    Vitamin D deficiency E55.9    Acne L70.9    Family history of breast cancer in sister Z80.2    Overweight (BMI 25.0-29. 9) E66.3    COVID-19 U07.1       History reviewed. No pertinent past medical history. Current Outpatient Medications   Medication Sig Dispense Refill    ergocalciferol (ERGOCALCIFEROL) 1,250 mcg (50,000 unit) capsule Take 1 Capsule by mouth every seven (7) days. 12 Capsule 1    albuterol (PROVENTIL HFA, VENTOLIN HFA, PROAIR HFA) 90 mcg/actuation inhaler Take 2 Puffs by inhalation every six (6) hours as needed for Wheezing. 1 Each 1    Claritin-D 24 Hour  mg per tablet Take 1 Tablet by mouth daily.  90 Tablet 1    fluticasone propionate (FLONASE) 50 mcg/actuation nasal spray 1 [place] by Both Nostrils route daily. Indications: inflammation of the nose due to an allergy 3 Each 1    zolpidem (AMBIEN) 10 mg tablet Take 1 Tablet by mouth nightly as needed for Sleep. Max Daily Amount: 10 mg. 30 Tablet 3    desonide (TRIDESILON) 0.05 % cream apply to affected area twice daily 60 g 0    azelastine (OPTIVAR) 0.05 % ophthalmic solution Administer 1 Drop to both eyes two (2) times a day. Use in affected eye(s) 6 mL 3    cyclobenzaprine (FLEXERIL) 10 mg tablet Take 1 Tablet by mouth three (3) times daily as needed for Muscle Spasm(s). 30 Tablet 3       Social History     Tobacco Use   Smoking Status Former    Types: Cigarettes   Smokeless Tobacco Former       No Known Allergies    Patient Labs were reviewed: yes    Patient Past Records were reviewed: yes      Objective:     Vitals:    10/05/22 1019   BP: 124/73   Pulse: 68   Resp: 16   Temp: 98.2 °F (36.8 °C)   TempSrc: Temporal   SpO2: 98%   Weight: 174 lb 3.2 oz (79 kg)   Height: 5' 6\" (1.676 m)     Body mass index is 28.12 kg/m². Exam:   Appearance: alert, well appearing,  oriented to person, place, and time, acyanotic, in no respiratory distress and well hydrated. HEENT:  NC/AT, pink conj, anicteric sclerae  Neck:  No cervical lymphadenopathy, no JVD, no thyromegaly, no carotid bruit  Heart:  RRR without M/R/G  Lungs:  CTAB, no rhonchi, rales, or wheezes with good air exchange   Abdomen:  Non-tender, pos bowel sounds, no hepatosplenomegaly  Ext:  No C/C/E    Skin: no rash  Neuro: no lateralizing signs, CNs II-XII intact            I have discussed the diagnosis with the patient and the intended plan as seen in the above orders. The patient has received an After-Visit Summary and questions were answered concerning future plans. Medication Side Effects and Warnings were discussed with patient: yes    Patient verbalized understanding of above instructions.     Kay Ojeda MD  Internal Medicine  [facility]

## 2022-10-06 ENCOUNTER — TELEPHONE (OUTPATIENT)
Dept: FAMILY MEDICINE CLINIC | Age: 57
End: 2022-10-06

## 2022-10-06 LAB
25(OH)D3+25(OH)D2 SERPL-MCNC: 43.4 NG/ML (ref 30–100)
ALBUMIN SERPL-MCNC: 4.3 G/DL (ref 3.8–4.9)
ALBUMIN/GLOB SERPL: 1.4 {RATIO} (ref 1.2–2.2)
ALP SERPL-CCNC: 86 IU/L (ref 44–121)
ALT SERPL-CCNC: 23 IU/L (ref 0–32)
AST SERPL-CCNC: 19 IU/L (ref 0–40)
BASOPHILS # BLD AUTO: 0 X10E3/UL (ref 0–0.2)
BASOPHILS NFR BLD AUTO: 1 %
BILIRUB SERPL-MCNC: 0.3 MG/DL (ref 0–1.2)
BUN SERPL-MCNC: 12 MG/DL (ref 6–24)
BUN/CREAT SERPL: 16 (ref 9–23)
CALCIUM SERPL-MCNC: 9.5 MG/DL (ref 8.7–10.2)
CHLORIDE SERPL-SCNC: 104 MMOL/L (ref 96–106)
CHOLEST SERPL-MCNC: 181 MG/DL (ref 100–199)
CO2 SERPL-SCNC: 22 MMOL/L (ref 20–29)
CREAT SERPL-MCNC: 0.77 MG/DL (ref 0.57–1)
EGFR: 90 ML/MIN/1.73
EOSINOPHIL # BLD AUTO: 0.1 X10E3/UL (ref 0–0.4)
EOSINOPHIL NFR BLD AUTO: 3 %
ERYTHROCYTE [DISTWIDTH] IN BLOOD BY AUTOMATED COUNT: 13.7 % (ref 11.7–15.4)
GLOBULIN SER CALC-MCNC: 3 G/DL (ref 1.5–4.5)
GLUCOSE SERPL-MCNC: 88 MG/DL (ref 70–99)
HCT VFR BLD AUTO: 39.9 % (ref 34–46.6)
HDLC SERPL-MCNC: 69 MG/DL
HGB BLD-MCNC: 12.9 G/DL (ref 11.1–15.9)
IMM GRANULOCYTES # BLD AUTO: 0 X10E3/UL (ref 0–0.1)
IMM GRANULOCYTES NFR BLD AUTO: 0 %
IMP & REVIEW OF LAB RESULTS: NORMAL
LDLC SERPL CALC-MCNC: 97 MG/DL (ref 0–99)
LYMPHOCYTES # BLD AUTO: 2.4 X10E3/UL (ref 0.7–3.1)
LYMPHOCYTES NFR BLD AUTO: 57 %
MCH RBC QN AUTO: 26.4 PG (ref 26.6–33)
MCHC RBC AUTO-ENTMCNC: 32.3 G/DL (ref 31.5–35.7)
MCV RBC AUTO: 82 FL (ref 79–97)
MONOCYTES # BLD AUTO: 0.5 X10E3/UL (ref 0.1–0.9)
MONOCYTES NFR BLD AUTO: 11 %
NEUTROPHILS # BLD AUTO: 1.2 X10E3/UL (ref 1.4–7)
NEUTROPHILS NFR BLD AUTO: 28 %
PLATELET # BLD AUTO: 308 X10E3/UL (ref 150–450)
POTASSIUM SERPL-SCNC: 4.2 MMOL/L (ref 3.5–5.2)
PROT SERPL-MCNC: 7.3 G/DL (ref 6–8.5)
RBC # BLD AUTO: 4.88 X10E6/UL (ref 3.77–5.28)
SODIUM SERPL-SCNC: 142 MMOL/L (ref 134–144)
TRIGL SERPL-MCNC: 81 MG/DL (ref 0–149)
VLDLC SERPL CALC-MCNC: 15 MG/DL (ref 5–40)
WBC # BLD AUTO: 4.2 X10E3/UL (ref 3.4–10.8)

## 2022-10-06 NOTE — TELEPHONE ENCOUNTER
Patient called for Alley Burgos with c/o cough and head congestion.  Please call in to [address]

## 2022-10-10 RX ORDER — AZITHROMYCIN 250 MG/1
TABLET, FILM COATED ORAL
Qty: 6 TABLET | Refills: 0 | Status: SHIPPED | OUTPATIENT
Start: 2022-10-10 | End: 2022-10-15

## 2022-10-11 ENCOUNTER — TELEPHONE (OUTPATIENT)
Dept: FAMILY MEDICINE CLINIC | Age: 57
End: 2022-10-11

## 2022-10-11 DIAGNOSIS — J30.9 ALLERGIC RHINITIS, UNSPECIFIED SEASONALITY, UNSPECIFIED TRIGGER: Primary | ICD-10-CM

## 2022-10-11 RX ORDER — FEXOFENADINE HCL AND PSEUDOEPHEDRINE HCI 180; 240 MG/1; MG/1
1 TABLET, EXTENDED RELEASE ORAL
Qty: 90 TABLET | Refills: 0 | Status: SHIPPED | OUTPATIENT
Start: 2022-10-11

## 2023-04-05 ENCOUNTER — OFFICE VISIT (OUTPATIENT)
Facility: CLINIC | Age: 58
End: 2023-04-05
Payer: COMMERCIAL

## 2023-04-05 VITALS
TEMPERATURE: 98.2 F | RESPIRATION RATE: 16 BRPM | WEIGHT: 163 LBS | DIASTOLIC BLOOD PRESSURE: 58 MMHG | HEIGHT: 66 IN | OXYGEN SATURATION: 99 % | HEART RATE: 69 BPM | BODY MASS INDEX: 26.2 KG/M2 | SYSTOLIC BLOOD PRESSURE: 122 MMHG

## 2023-04-05 DIAGNOSIS — R73.01 IMPAIRED FASTING GLUCOSE: ICD-10-CM

## 2023-04-05 DIAGNOSIS — F51.02 ADJUSTMENT INSOMNIA: ICD-10-CM

## 2023-04-05 DIAGNOSIS — Z00.00 WELL WOMAN EXAM (NO GYNECOLOGICAL EXAM): Primary | ICD-10-CM

## 2023-04-05 DIAGNOSIS — L81.9 HYPERPIGMENTATION: ICD-10-CM

## 2023-04-05 DIAGNOSIS — N62 MACROMASTIA: ICD-10-CM

## 2023-04-05 DIAGNOSIS — E55.9 VITAMIN D DEFICIENCY, UNSPECIFIED: ICD-10-CM

## 2023-04-05 DIAGNOSIS — J30.9 ALLERGIC RHINITIS, UNSPECIFIED SEASONALITY, UNSPECIFIED TRIGGER: ICD-10-CM

## 2023-04-05 DIAGNOSIS — Z12.31 ENCOUNTER FOR SCREENING MAMMOGRAM FOR MALIGNANT NEOPLASM OF BREAST: ICD-10-CM

## 2023-04-05 DIAGNOSIS — T30.0 BURN: ICD-10-CM

## 2023-04-05 PROCEDURE — 99396 PREV VISIT EST AGE 40-64: CPT | Performed by: INTERNAL MEDICINE

## 2023-04-05 RX ORDER — FLUTICASONE PROPIONATE 50 MCG
1 SPRAY, SUSPENSION (ML) NASAL DAILY
Qty: 16 G | Refills: 3 | Status: SHIPPED | OUTPATIENT
Start: 2023-04-05

## 2023-04-05 RX ORDER — DESONIDE 0.5 MG/G
CREAM TOPICAL
Qty: 60 G | Refills: 0 | Status: SHIPPED | OUTPATIENT
Start: 2023-04-05

## 2023-04-05 RX ORDER — ERGOCALCIFEROL 1.25 MG/1
50000 CAPSULE ORAL
Qty: 12 CAPSULE | Refills: 1 | Status: SHIPPED | OUTPATIENT
Start: 2023-04-05

## 2023-04-05 RX ORDER — ALBUTEROL SULFATE 90 UG/1
2 AEROSOL, METERED RESPIRATORY (INHALATION) EVERY 6 HOURS PRN
Qty: 18 G | Refills: 1 | Status: SHIPPED | OUTPATIENT
Start: 2023-04-05

## 2023-04-05 RX ORDER — ZOLPIDEM TARTRATE 10 MG/1
10 TABLET ORAL NIGHTLY PRN
Qty: 90 TABLET | Refills: 0 | Status: SHIPPED | OUTPATIENT
Start: 2023-04-05 | End: 2023-07-04

## 2023-04-05 RX ORDER — AZELASTINE HYDROCHLORIDE 0.5 MG/ML
1 SOLUTION/ DROPS OPHTHALMIC 2 TIMES DAILY
Qty: 6 ML | Refills: 3 | Status: SHIPPED | OUTPATIENT
Start: 2023-04-05

## 2023-04-05 ASSESSMENT — PATIENT HEALTH QUESTIONNAIRE - PHQ9
SUM OF ALL RESPONSES TO PHQ QUESTIONS 1-9: 0
SUM OF ALL RESPONSES TO PHQ QUESTIONS 1-9: 0
2. FEELING DOWN, DEPRESSED OR HOPELESS: 0
SUM OF ALL RESPONSES TO PHQ QUESTIONS 1-9: 0
SUM OF ALL RESPONSES TO PHQ QUESTIONS 1-9: 0
1. LITTLE INTEREST OR PLEASURE IN DOING THINGS: 0
SUM OF ALL RESPONSES TO PHQ9 QUESTIONS 1 & 2: 0

## 2023-04-05 NOTE — PROGRESS NOTES
1. \"Have you been to the ER, urgent care clinic since your last visit? Hospitalized since your last visit? \" No    2. \"Have you seen or consulted any other health care providers outside of the [address] [place] [address] since your last visit? \" No    3. For patients aged 39-70: Has the patient had a colonoscopy / FIT/ Cologuard? Yes      If the patient is female:    4. For patients aged 41-77: Has the patient had a mammogram within the past 2 years? Yes - no Care Gap present    5. For patients aged 21-65: Has the patient had a pap smear?  NA - based on age or sex    Health Maintenance Due   Topic Date Due    HIV screen  Never done    Shingles vaccine (1 of 2) Never done    COVID-19 Vaccine (4 - Booster for Moderna series) 01/04/2022    Depression Screen  04/04/2023
tablet; Take 1 tablet by mouth nightly as needed for Sleep for up to 90 days. Max Daily Amount: 10 mg    Encounter for screening mammogram for malignant neoplasm of breast  -     AL DIGITAL SCREEN W OR WO CAD BILATERAL; Future        Follow-up and Dispositions    Return in about 1 year (around 4/5/2024) for physical.                 Chief Complaint   Patient presents with    Annual Exam    Burn     Right hand palm       Pt is a 63y.o. year old female who presents for follow up of her chronic medical problems/annual wellness visit    Health Maintenance Due   Topic Date Due    HIV screen  Never done    Shingles vaccine (1 of 2) Never done    COVID-19 Vaccine (4 - Booster for Moderna series) 01/04/2022    Depression Screen  04/04/2023      Wt Readings from Last 3 Encounters:   04/05/23 163 lb (73.9 kg)   10/05/22 174 lb 3.2 oz (79 kg)   04/04/22 167 lb 4.8 oz (75.9 kg)        BP Readings from Last 3 Encounters:   04/05/23 (!) 122/58   10/05/22 124/73   04/04/22 133/74    Repeat BP-    ?fasting-had a sandwich    Lab Results   Component Value Date/Time    VITD25 43.4 10/05/2022 11:05 AM     On rx    Lab Results   Component Value Date/Time    CHOL 181 10/05/2022 11:05 AM    TRIG 81 10/05/2022 11:05 AM    HDL 69 10/05/2022 11:05 AM    LDLCALC 97 10/05/2022 11:05 AM       Allergies-    Insomnia-prn for sleep    Hyperpigmentation-under the eyes    Breast reduction-needs referral to surgeon-bra marks deep; upper back pain  Send to plastic surgery   Weigth loss did not make the breasts get smaller    Burn on the right palm-needs Silvadene        ROS:    Pt denies: Wt loss, Fever/Chills, HA, Visual changes, Fatigue, Chest pain, SOB, CIFUENTES, Abd pain, N/V/D/C, Blood in stool or urine, Edema. Pertinent positive as above in HPI. All others were negative    Patient Active Problem List   Diagnosis    Family history of breast cancer in sister    CTS (carpal tunnel syndrome)    Allergic rhinitis    Overweight (BMI 25.0-29. 9)

## 2023-04-06 LAB
25(OH)D3+25(OH)D2 SERPL-MCNC: 40.4 NG/ML (ref 30–100)
ALBUMIN SERPL-MCNC: 3.7 G/DL (ref 3.8–4.9)
ALBUMIN/GLOB SERPL: 1.6 {RATIO} (ref 1.2–2.2)
ALP SERPL-CCNC: 65 IU/L (ref 44–121)
ALT SERPL-CCNC: 13 IU/L (ref 0–32)
AST SERPL-CCNC: 13 IU/L (ref 0–40)
BASOPHILS # BLD AUTO: 0.1 X10E3/UL (ref 0–0.2)
BASOPHILS NFR BLD AUTO: 1 %
BILIRUB SERPL-MCNC: 0.3 MG/DL (ref 0–1.2)
BUN SERPL-MCNC: 9 MG/DL (ref 6–24)
BUN/CREAT SERPL: 13 (ref 9–23)
CALCIUM SERPL-MCNC: 8.8 MG/DL (ref 8.7–10.2)
CHLORIDE SERPL-SCNC: 105 MMOL/L (ref 96–106)
CHOLEST SERPL-MCNC: 165 MG/DL (ref 100–199)
CO2 SERPL-SCNC: 21 MMOL/L (ref 20–29)
CREAT SERPL-MCNC: 0.71 MG/DL (ref 0.57–1)
EGFRCR SERPLBLD CKD-EPI 2021: 99 ML/MIN/1.73
EOSINOPHIL # BLD AUTO: 2.2 X10E3/UL (ref 0–0.4)
EOSINOPHIL NFR BLD AUTO: 24 %
ERYTHROCYTE [DISTWIDTH] IN BLOOD BY AUTOMATED COUNT: 13.5 % (ref 11.7–15.4)
GLOBULIN SER CALC-MCNC: 2.3 G/DL (ref 1.5–4.5)
GLUCOSE SERPL-MCNC: 74 MG/DL (ref 70–99)
HBA1C MFR BLD: 5.8 % (ref 4.8–5.6)
HCT VFR BLD AUTO: 41 % (ref 34–46.6)
HDLC SERPL-MCNC: 63 MG/DL
HGB BLD-MCNC: 13.2 G/DL (ref 11.1–15.9)
IMM GRANULOCYTES # BLD AUTO: 0 X10E3/UL (ref 0–0.1)
IMM GRANULOCYTES NFR BLD AUTO: 0 %
LDLC SERPL CALC-MCNC: 83 MG/DL (ref 0–99)
LYMPHOCYTES # BLD AUTO: 4.1 X10E3/UL (ref 0.7–3.1)
LYMPHOCYTES NFR BLD AUTO: 46 %
MCH RBC QN AUTO: 26.9 PG (ref 26.6–33)
MCHC RBC AUTO-ENTMCNC: 32.2 G/DL (ref 31.5–35.7)
MCV RBC AUTO: 84 FL (ref 79–97)
MONOCYTES # BLD AUTO: 0.3 X10E3/UL (ref 0.1–0.9)
MONOCYTES NFR BLD AUTO: 4 %
MORPHOLOGY BLD-IMP: ABNORMAL
NEUTROPHILS # BLD AUTO: 2.3 X10E3/UL (ref 1.4–7)
NEUTROPHILS NFR BLD AUTO: 25 %
PLATELET # BLD AUTO: 330 X10E3/UL (ref 150–450)
POTASSIUM SERPL-SCNC: 4.4 MMOL/L (ref 3.5–5.2)
PROT SERPL-MCNC: 6 G/DL (ref 6–8.5)
RBC # BLD AUTO: 4.9 X10E6/UL (ref 3.77–5.28)
SODIUM SERPL-SCNC: 145 MMOL/L (ref 134–144)
SPECIMEN STATUS REPORT: NORMAL
TRIGL SERPL-MCNC: 109 MG/DL (ref 0–149)
VLDLC SERPL CALC-MCNC: 19 MG/DL (ref 5–40)
WBC # BLD AUTO: 8.9 X10E3/UL (ref 3.4–10.8)

## 2023-05-01 ENCOUNTER — TELEPHONE (OUTPATIENT)
Facility: CLINIC | Age: 58
End: 2023-05-01

## 2023-12-12 RX ORDER — LORATADINE AND PSEUDOEPHEDRINE SULFATE 10; 240 MG/1; MG/1
1 TABLET, EXTENDED RELEASE ORAL DAILY
Qty: 90 TABLET | Refills: 0 | Status: SHIPPED | OUTPATIENT
Start: 2023-12-12

## 2024-04-08 ENCOUNTER — OFFICE VISIT (OUTPATIENT)
Facility: CLINIC | Age: 59
End: 2024-04-08
Payer: COMMERCIAL

## 2024-04-08 VITALS
WEIGHT: 170.8 LBS | OXYGEN SATURATION: 97 % | SYSTOLIC BLOOD PRESSURE: 126 MMHG | HEIGHT: 66 IN | RESPIRATION RATE: 16 BRPM | TEMPERATURE: 97.9 F | BODY MASS INDEX: 27.45 KG/M2 | DIASTOLIC BLOOD PRESSURE: 76 MMHG | HEART RATE: 85 BPM

## 2024-04-08 DIAGNOSIS — E55.9 VITAMIN D DEFICIENCY, UNSPECIFIED: ICD-10-CM

## 2024-04-08 DIAGNOSIS — J30.9 ALLERGIC RHINITIS, UNSPECIFIED SEASONALITY, UNSPECIFIED TRIGGER: ICD-10-CM

## 2024-04-08 DIAGNOSIS — N62 MACROMASTIA: ICD-10-CM

## 2024-04-08 DIAGNOSIS — Z23 NEED FOR SHINGLES VACCINE: ICD-10-CM

## 2024-04-08 DIAGNOSIS — M62.830 BACK SPASM: ICD-10-CM

## 2024-04-08 DIAGNOSIS — L81.9 HYPERPIGMENTATION: ICD-10-CM

## 2024-04-08 DIAGNOSIS — R20.2 RIGHT LEG PARESTHESIAS: ICD-10-CM

## 2024-04-08 DIAGNOSIS — Z00.00 WELL WOMAN EXAM (NO GYNECOLOGICAL EXAM): Primary | ICD-10-CM

## 2024-04-08 DIAGNOSIS — M19.171 POST-TRAUMATIC ARTHRITIS OF RIGHT ANKLE: ICD-10-CM

## 2024-04-08 DIAGNOSIS — H10.13 ALLERGIC CONJUNCTIVITIS OF BOTH EYES: ICD-10-CM

## 2024-04-08 DIAGNOSIS — B35.1 ONYCHOMYCOSIS: ICD-10-CM

## 2024-04-08 PROCEDURE — 90750 HZV VACC RECOMBINANT IM: CPT | Performed by: INTERNAL MEDICINE

## 2024-04-08 PROCEDURE — 99396 PREV VISIT EST AGE 40-64: CPT | Performed by: INTERNAL MEDICINE

## 2024-04-08 PROCEDURE — 90471 IMMUNIZATION ADMIN: CPT | Performed by: INTERNAL MEDICINE

## 2024-04-08 RX ORDER — ALBUTEROL SULFATE 90 UG/1
2 AEROSOL, METERED RESPIRATORY (INHALATION) EVERY 6 HOURS PRN
Qty: 18 G | Refills: 1 | Status: SHIPPED | OUTPATIENT
Start: 2024-04-08

## 2024-04-08 RX ORDER — OLOPATADINE HYDROCHLORIDE 2 MG/ML
1 SOLUTION/ DROPS OPHTHALMIC DAILY
Qty: 2.5 ML | Refills: 3 | Status: SHIPPED | OUTPATIENT
Start: 2024-04-08

## 2024-04-08 RX ORDER — DESONIDE 0.5 MG/G
CREAM TOPICAL
Qty: 60 G | Refills: 0 | Status: SHIPPED | OUTPATIENT
Start: 2024-04-08

## 2024-04-08 RX ORDER — FLUTICASONE PROPIONATE 50 MCG
1 SPRAY, SUSPENSION (ML) NASAL DAILY
Qty: 16 G | Refills: 3 | Status: SHIPPED | OUTPATIENT
Start: 2024-04-08

## 2024-04-08 RX ORDER — CYCLOBENZAPRINE HCL 10 MG
10 TABLET ORAL 3 TIMES DAILY PRN
Qty: 90 TABLET | Refills: 1 | Status: SHIPPED | OUTPATIENT
Start: 2024-04-08

## 2024-04-08 RX ORDER — LORATADINE AND PSEUDOEPHEDRINE SULFATE 10; 240 MG/1; MG/1
1 TABLET, EXTENDED RELEASE ORAL DAILY
Qty: 90 TABLET | Refills: 1 | Status: SHIPPED | OUTPATIENT
Start: 2024-04-08

## 2024-04-08 RX ORDER — CICLOPIROX 80 MG/ML
SOLUTION TOPICAL
Qty: 6 ML | Refills: 3 | Status: SHIPPED | OUTPATIENT
Start: 2024-04-08

## 2024-04-08 SDOH — ECONOMIC STABILITY: FOOD INSECURITY: WITHIN THE PAST 12 MONTHS, THE FOOD YOU BOUGHT JUST DIDN'T LAST AND YOU DIDN'T HAVE MONEY TO GET MORE.: NEVER TRUE

## 2024-04-08 SDOH — ECONOMIC STABILITY: HOUSING INSECURITY
IN THE LAST 12 MONTHS, WAS THERE A TIME WHEN YOU DID NOT HAVE A STEADY PLACE TO SLEEP OR SLEPT IN A SHELTER (INCLUDING NOW)?: NO

## 2024-04-08 SDOH — ECONOMIC STABILITY: FOOD INSECURITY: WITHIN THE PAST 12 MONTHS, YOU WORRIED THAT YOUR FOOD WOULD RUN OUT BEFORE YOU GOT MONEY TO BUY MORE.: NEVER TRUE

## 2024-04-08 ASSESSMENT — PATIENT HEALTH QUESTIONNAIRE - PHQ9
SUM OF ALL RESPONSES TO PHQ9 QUESTIONS 1 & 2: 0
SUM OF ALL RESPONSES TO PHQ QUESTIONS 1-9: 0
1. LITTLE INTEREST OR PLEASURE IN DOING THINGS: NOT AT ALL
SUM OF ALL RESPONSES TO PHQ QUESTIONS 1-9: 0
2. FEELING DOWN, DEPRESSED OR HOPELESS: NOT AT ALL
SUM OF ALL RESPONSES TO PHQ QUESTIONS 1-9: 0
SUM OF ALL RESPONSES TO PHQ QUESTIONS 1-9: 0

## 2024-04-08 NOTE — PATIENT INSTRUCTIONS
Refer to Plastic surgery for second opinion  New eye drops for allergies  Topical med for toenail fungus    Mail labs/call

## 2024-04-08 NOTE — PROGRESS NOTES
\"Have you been to the ER, urgent care clinic since your last visit?  Hospitalized since your last visit?\"    NO    “Have you seen or consulted any other health care providers outside of [facility] since your last visit?”    NO            Click Here for Release of Records Request  
physical.                     Chief Complaint   Patient presents with    Annual Exam    Other     Need meds for seasonal allergies and eye drops       Pt is a 59 y.o. year old female who presents for follow up of her chronic medical problems    Health Maintenance Due   Topic Date Due    Hepatitis B vaccine (1 of 3 - 3-dose series) Never done    HIV screen  Never done    Shingles vaccine (1 of 2)-today Never done    COVID-19 Vaccine (4 - 2023-24 season) 09/01/2023    A1C test (Diabetic or Prediabetic)  04/05/2024    Depression Screen  04/05/2024      Wt Readings from Last 3 Encounters:   04/08/24 77.5 kg (170 lb 12.8 oz)   04/05/23 73.9 kg (163 lb)   10/05/22 79 kg (174 lb 3.2 oz)      ?fasting-yes    Colono due 2026  Mammo due in June-[facility]    Hemoglobin A1C   Date Value Ref Range Status   04/05/2023 5.8 (H) 4.8 - 5.6 % Final     Comment:              Prediabetes: 5.7 - 6.4           Diabetes: >6.4           Glycemic control for adults with diabetes: <7.0       Hemoglobin A1C, POC   Date Value Ref Range Status   10/05/2022 5.7 % Final    Repeat check at the job-was fine    Wants call for labs/mail    Azelastin does not work any more  for eye sxs    Lab Results   Component Value Date/Time    VITD25 33.0 04/08/2024 12:00 AM     Not on rx    Toenail fungus    Burnign sensation on the feet    Sister had cataract    Wears glasses    Right ankle swelling      ROS:    Pt denies: Wt loss, Fever/Chills, HA, Visual changes, Fatigue, Chest pain, SOB, CIFUENTES, Abd pain, N/V/D/C, Blood in stool or urine, Edema. Pertinent positive as above in HPI. All others were negative    Patient Active Problem List   Diagnosis    Family history of breast cancer in sister    CTS (carpal tunnel syndrome)    Allergic rhinitis    Overweight (BMI 25.0-29.9)    COVID-19    Acne    Vitamin D deficiency       History reviewed. No pertinent past medical history.    Current Outpatient Medications   Medication Sig Dispense Refill    [facility] ALLERCLEAR D-24HR

## 2024-04-09 LAB
25(OH)D3+25(OH)D2 SERPL-MCNC: 33 NG/ML (ref 30–100)
ALBUMIN SERPL-MCNC: 4 G/DL (ref 3.8–4.9)
ALBUMIN/GLOB SERPL: 1.3 {RATIO} (ref 1.2–2.2)
ALP SERPL-CCNC: 83 IU/L (ref 44–121)
ALT SERPL-CCNC: 26 IU/L (ref 0–32)
AST SERPL-CCNC: 26 IU/L (ref 0–40)
BASOPHILS # BLD AUTO: 0 X10E3/UL (ref 0–0.2)
BASOPHILS NFR BLD AUTO: 1 %
BILIRUB SERPL-MCNC: <0.2 MG/DL (ref 0–1.2)
BUN SERPL-MCNC: 18 MG/DL (ref 6–24)
BUN/CREAT SERPL: 33 (ref 9–23)
CALCIUM SERPL-MCNC: 9.8 MG/DL (ref 8.7–10.2)
CHLORIDE SERPL-SCNC: 102 MMOL/L (ref 96–106)
CHOLEST SERPL-MCNC: 165 MG/DL (ref 100–199)
CO2 SERPL-SCNC: 23 MMOL/L (ref 20–29)
CREAT SERPL-MCNC: 0.55 MG/DL (ref 0.57–1)
EGFRCR SERPLBLD CKD-EPI 2021: 106 ML/MIN/1.73
EOSINOPHIL # BLD AUTO: 0.1 X10E3/UL (ref 0–0.4)
EOSINOPHIL NFR BLD AUTO: 1 %
ERYTHROCYTE [DISTWIDTH] IN BLOOD BY AUTOMATED COUNT: 13.2 % (ref 11.7–15.4)
GLOBULIN SER CALC-MCNC: 3.1 G/DL (ref 1.5–4.5)
GLUCOSE SERPL-MCNC: 107 MG/DL (ref 70–99)
HCT VFR BLD AUTO: 39.1 % (ref 34–46.6)
HDLC SERPL-MCNC: 76 MG/DL
HGB BLD-MCNC: 12.3 G/DL (ref 11.1–15.9)
IMM GRANULOCYTES # BLD AUTO: 0 X10E3/UL (ref 0–0.1)
IMM GRANULOCYTES NFR BLD AUTO: 0 %
IMP & REVIEW OF LAB RESULTS: NORMAL
LDLC SERPL CALC-MCNC: 78 MG/DL (ref 0–99)
LYMPHOCYTES # BLD AUTO: 2.9 X10E3/UL (ref 0.7–3.1)
LYMPHOCYTES NFR BLD AUTO: 52 %
MCH RBC QN AUTO: 26.2 PG (ref 26.6–33)
MCHC RBC AUTO-ENTMCNC: 31.5 G/DL (ref 31.5–35.7)
MCV RBC AUTO: 83 FL (ref 79–97)
MONOCYTES # BLD AUTO: 0.4 X10E3/UL (ref 0.1–0.9)
MONOCYTES NFR BLD AUTO: 8 %
NEUTROPHILS # BLD AUTO: 2.1 X10E3/UL (ref 1.4–7)
NEUTROPHILS NFR BLD AUTO: 38 %
PLATELET # BLD AUTO: 308 X10E3/UL (ref 150–450)
POTASSIUM SERPL-SCNC: 5.1 MMOL/L (ref 3.5–5.2)
PROT SERPL-MCNC: 7.1 G/DL (ref 6–8.5)
RBC # BLD AUTO: 4.7 X10E6/UL (ref 3.77–5.28)
SODIUM SERPL-SCNC: 138 MMOL/L (ref 134–144)
SPECIMEN STATUS REPORT: NORMAL
TRIGL SERPL-MCNC: 51 MG/DL (ref 0–149)
TSH SERPL DL<=0.005 MIU/L-ACNC: <0.005 UIU/ML (ref 0.45–4.5)
VIT B12 SERPL-MCNC: 782 PG/ML (ref 232–1245)
VLDLC SERPL CALC-MCNC: 11 MG/DL (ref 5–40)
WBC # BLD AUTO: 5.6 X10E3/UL (ref 3.4–10.8)

## 2024-04-15 DIAGNOSIS — E05.90 HYPERTHYROIDISM: Primary | ICD-10-CM

## 2024-04-15 DIAGNOSIS — E55.9 VITAMIN D DEFICIENCY, UNSPECIFIED: ICD-10-CM

## 2024-04-15 DIAGNOSIS — R73.01 IMPAIRED FASTING GLUCOSE: ICD-10-CM

## 2024-04-15 RX ORDER — ERGOCALCIFEROL 1.25 MG/1
50000 CAPSULE ORAL
Qty: 12 CAPSULE | Refills: 1 | Status: SHIPPED | OUTPATIENT
Start: 2024-04-15

## 2024-04-24 ENCOUNTER — CLINICAL DOCUMENTATION (OUTPATIENT)
Facility: CLINIC | Age: 59
End: 2024-04-24

## 2024-04-24 ENCOUNTER — TELEPHONE (OUTPATIENT)
Facility: CLINIC | Age: 59
End: 2024-04-24

## 2024-04-24 NOTE — PROGRESS NOTES
Spoke to patient myself about her labs-TSH low but she has no sxs of hyperthyroidism so will repeat labs  FBS ia 107 so I ordered an A1C    I also gave her the name and number of the plastic surgeon we sent out referral to

## 2024-04-24 NOTE — TELEPHONE ENCOUNTER
PT is calling about her test results and wants to get an nurse to give her call back. Pt stated that she called in yesterday and didn't receive an venus back pt stated that it has been 2 weeks and that she doesn't use mychart and will come in office if no call back is returned

## 2024-04-30 LAB — HBA1C MFR BLD: 6 % (ref 4.8–5.6)

## 2024-05-01 LAB
T3FREE SERPL-MCNC: 22.8 PG/ML (ref 2–4.4)
T4 FREE SERPL-MCNC: 6.93 NG/DL (ref 0.82–1.77)
TSH SERPL DL<=0.005 MIU/L-ACNC: <0.005 UIU/ML (ref 0.45–4.5)

## 2024-05-02 DIAGNOSIS — E05.90 HYPERTHYROIDISM: Primary | ICD-10-CM

## 2024-05-10 ENCOUNTER — TELEPHONE (OUTPATIENT)
Facility: CLINIC | Age: 59
End: 2024-05-10

## 2024-05-10 NOTE — TELEPHONE ENCOUNTER
Patient stated that the Specialist that she was referred to isn't accepting patients and was scheduled with NP on 5/22/24. Patient wants a referral to specialist that can see her sooner than 5/22/24.

## 2024-05-10 NOTE — TELEPHONE ENCOUNTER
Patient stated that the provider she was referred to does not take new patients and was scheduled with NP. Patient wants referral to someone that can see her next week.

## 2024-05-13 ENCOUNTER — TELEPHONE (OUTPATIENT)
Facility: CLINIC | Age: 59
End: 2024-05-13

## 2024-05-13 NOTE — TELEPHONE ENCOUNTER
Pt called to find out if she needs to get labs done first before 5/15 appt or if she can keep the appt.  Please call pt back to confirm, pt says nurse was supposed to call her back from the last time they spoke.

## 2024-05-13 NOTE — TELEPHONE ENCOUNTER
Pt called because she says she never got a call back from the nurse about whether or not she needs to complete her labs before her appointment.  Please call pt back.

## 2024-05-14 ENCOUNTER — TELEPHONE (OUTPATIENT)
Facility: CLINIC | Age: 59
End: 2024-05-14

## 2024-05-14 NOTE — TELEPHONE ENCOUNTER
Pt called because she says she never got a call back from the nurse about whether or not she needs to complete her labs before her appointment.  Please call pt back.   Pt is confused on if appt on 5/15 is a lab appt or an OV, I told pt that it is an OV appt F/U. Please call pt back before the EOD today on 5/14 so pt can decide if she is coming to her appt on 5/15.

## 2025-01-07 ENCOUNTER — OFFICE VISIT (OUTPATIENT)
Facility: CLINIC | Age: 60
End: 2025-01-07

## 2025-01-07 VITALS
RESPIRATION RATE: 16 BRPM | WEIGHT: 168 LBS | SYSTOLIC BLOOD PRESSURE: 131 MMHG | TEMPERATURE: 98.4 F | OXYGEN SATURATION: 97 % | DIASTOLIC BLOOD PRESSURE: 80 MMHG | BODY MASS INDEX: 27 KG/M2 | HEIGHT: 66 IN | HEART RATE: 73 BPM

## 2025-01-07 DIAGNOSIS — R05.1 ACUTE COUGH: Primary | ICD-10-CM

## 2025-01-07 DIAGNOSIS — Z86.39 HISTORY OF GRAVES' DISEASE: ICD-10-CM

## 2025-01-07 DIAGNOSIS — R73.03 PREDIABETES: ICD-10-CM

## 2025-01-07 DIAGNOSIS — H10.13 ALLERGIC CONJUNCTIVITIS OF BOTH EYES: ICD-10-CM

## 2025-01-07 DIAGNOSIS — B35.1 ONYCHOMYCOSIS: ICD-10-CM

## 2025-01-07 DIAGNOSIS — M62.830 BACK SPASM: ICD-10-CM

## 2025-01-07 DIAGNOSIS — E89.0 POST-SURGICAL HYPOTHYROIDISM: ICD-10-CM

## 2025-01-07 DIAGNOSIS — J30.9 ALLERGIC RHINITIS, UNSPECIFIED SEASONALITY, UNSPECIFIED TRIGGER: ICD-10-CM

## 2025-01-07 PROCEDURE — 99214 OFFICE O/P EST MOD 30 MIN: CPT | Performed by: INTERNAL MEDICINE

## 2025-01-07 RX ORDER — HYDROCODONE POLISTIREX AND CHLORPHENIRAMINE POLISTIREX 10; 8 MG/5ML; MG/5ML
5 SUSPENSION, EXTENDED RELEASE ORAL EVERY 12 HOURS PRN
Qty: 100 ML | Refills: 0 | Status: SHIPPED | OUTPATIENT
Start: 2025-01-07 | End: 2025-01-17

## 2025-01-07 RX ORDER — CYCLOBENZAPRINE HCL 10 MG
10 TABLET ORAL 3 TIMES DAILY PRN
Qty: 90 TABLET | Refills: 1 | Status: SHIPPED | OUTPATIENT
Start: 2025-01-07

## 2025-01-07 RX ORDER — ALBUTEROL SULFATE 90 UG/1
2 INHALANT RESPIRATORY (INHALATION) EVERY 6 HOURS PRN
Qty: 18 G | Refills: 1 | Status: SHIPPED | OUTPATIENT
Start: 2025-01-07

## 2025-01-07 RX ORDER — PSYLLIUM HUSK 0.4 G
500 CAPSULE ORAL 2 TIMES DAILY
COMMUNITY
Start: 2024-08-24

## 2025-01-07 RX ORDER — AZITHROMYCIN 250 MG/1
250 TABLET, FILM COATED ORAL SEE ADMIN INSTRUCTIONS
Qty: 6 TABLET | Refills: 0 | Status: SHIPPED | OUTPATIENT
Start: 2025-01-07 | End: 2025-01-12

## 2025-01-07 RX ORDER — OLOPATADINE HYDROCHLORIDE 2 MG/ML
1 SOLUTION/ DROPS OPHTHALMIC DAILY
Qty: 2.5 ML | Refills: 3 | Status: SHIPPED | OUTPATIENT
Start: 2025-01-07

## 2025-01-07 RX ORDER — LORATADINE AND PSEUDOEPHEDRINE SULFATE 10; 240 MG/1; MG/1
1 TABLET, EXTENDED RELEASE ORAL DAILY
Qty: 90 TABLET | Refills: 1 | Status: SHIPPED | OUTPATIENT
Start: 2025-01-07

## 2025-01-07 RX ORDER — CICLOPIROX 80 MG/ML
SOLUTION TOPICAL
Qty: 6 ML | Refills: 3 | Status: SHIPPED | OUTPATIENT
Start: 2025-01-07

## 2025-01-07 ASSESSMENT — PATIENT HEALTH QUESTIONNAIRE - PHQ9
SUM OF ALL RESPONSES TO PHQ9 QUESTIONS 1 & 2: 0
2. FEELING DOWN, DEPRESSED OR HOPELESS: NOT AT ALL
SUM OF ALL RESPONSES TO PHQ QUESTIONS 1-9: 0
1. LITTLE INTEREST OR PLEASURE IN DOING THINGS: NOT AT ALL

## 2025-01-07 NOTE — PROGRESS NOTES
\"Have you been to the ER, urgent care clinic since your last visit?  Hospitalized since your last visit?\"    NO    “Have you seen or consulted any other health care providers outside our system since your last visit?”    NO             
thyromegaly, no carotid bruit  Heart:  RRR without M/R/G  Lungs:  scattered rhonchi, no wheezes, with good air exchange   Abdomen:  Non-tender, pos bowel sounds, no hepatosplenomegaly  Ext:  No C/C/E    Skin: no rash  Neuro: no lateralizing signs, CNs II-XII intact            I have discussed the diagnosis with the patient and the intended plan as seen in the above orders.  The patient has received an After-Visit Summary and questions were answered concerning future plans.     Medication Side Effects and Warnings were discussed with patient: yes    Patient verbalized understanding of above instructions.    Zhane Greenfield MD  Internal Medicine  [facility]

## 2025-01-24 ENCOUNTER — TELEPHONE (OUTPATIENT)
Facility: CLINIC | Age: 60
End: 2025-01-24

## 2025-01-24 DIAGNOSIS — R05.1 ACUTE COUGH: Primary | ICD-10-CM

## 2025-01-24 PROBLEM — E05.00 GRAVES DISEASE: Status: ACTIVE | Noted: 2024-08-23

## 2025-01-24 RX ORDER — AZITHROMYCIN 250 MG/1
TABLET, FILM COATED ORAL
Qty: 6 TABLET | Refills: 0 | Status: SHIPPED | OUTPATIENT
Start: 2025-01-24 | End: 2025-02-03

## 2025-01-24 RX ORDER — PROPRANOLOL HYDROCHLORIDE 10 MG/1
10 TABLET ORAL PRN
COMMUNITY

## 2025-01-24 RX ORDER — LEVOTHYROXINE SODIUM 88 UG/1
TABLET ORAL
COMMUNITY
Start: 2024-09-13

## 2025-01-24 RX ORDER — CODEINE PHOSPHATE AND GUAIFENESIN 10; 100 MG/5ML; MG/5ML
5 SOLUTION ORAL 2 TIMES DAILY PRN
Qty: 50 ML | Refills: 0 | Status: SHIPPED | OUTPATIENT
Start: 2025-01-24 | End: 2025-01-29

## 2025-01-24 RX ORDER — METHIMAZOLE 10 MG/1
TABLET ORAL
COMMUNITY
Start: 2024-05-28

## 2025-01-24 RX ORDER — LORATADINE 10 MG/1
1 TABLET ORAL
COMMUNITY

## 2025-01-24 NOTE — PROGRESS NOTES
Patient seen on 01- by PCP and prescribed Tussionex for cough but medication not covered by insurance and is $65. Fax sent from [facility] to PCP's nurse for alternative cough syrup of guaifenesin with codeine on 01/20/2025 which is covered by patient's insurance. Patient is requesting new prescription that is covered by insurance; PDMP checked and sent to [facility].

## 2025-01-24 NOTE — TELEPHONE ENCOUNTER
Patient came into office upset. She stated that the pharmacy faxed over a request for a different medication that is formulary for her insurance and there was no response.    Per pharmacy:  Formulary cough medication is Guaifenesin with Codeine 100/10 MG 5 ml  Also patient is requesting a z pac.

## 2025-01-27 ENCOUNTER — TELEPHONE (OUTPATIENT)
Facility: CLINIC | Age: 60
End: 2025-01-27

## 2025-01-27 DIAGNOSIS — M79.673 HEEL PAIN, UNSPECIFIED LATERALITY: Primary | ICD-10-CM

## 2025-04-08 ENCOUNTER — OFFICE VISIT (OUTPATIENT)
Facility: CLINIC | Age: 60
End: 2025-04-08
Payer: COMMERCIAL

## 2025-04-08 ENCOUNTER — HOSPITAL ENCOUNTER (OUTPATIENT)
Facility: HOSPITAL | Age: 60
Setting detail: SPECIMEN
Discharge: HOME | End: 2025-04-11
Payer: COMMERCIAL

## 2025-04-08 VITALS
SYSTOLIC BLOOD PRESSURE: 116 MMHG | TEMPERATURE: 97.4 F | RESPIRATION RATE: 16 BRPM | HEIGHT: 66 IN | HEART RATE: 66 BPM | BODY MASS INDEX: 27.16 KG/M2 | DIASTOLIC BLOOD PRESSURE: 80 MMHG | WEIGHT: 169 LBS

## 2025-04-08 DIAGNOSIS — Z00.00 WELL WOMAN EXAM (NO GYNECOLOGICAL EXAM): ICD-10-CM

## 2025-04-08 DIAGNOSIS — J30.9 ALLERGIC RHINITIS, UNSPECIFIED SEASONALITY, UNSPECIFIED TRIGGER: ICD-10-CM

## 2025-04-08 DIAGNOSIS — R73.03 PREDIABETES: ICD-10-CM

## 2025-04-08 DIAGNOSIS — Z86.39 HISTORY OF GRAVES' DISEASE: ICD-10-CM

## 2025-04-08 DIAGNOSIS — E89.0 POST-SURGICAL HYPOTHYROIDISM: ICD-10-CM

## 2025-04-08 DIAGNOSIS — H10.13 ALLERGIC CONJUNCTIVITIS OF BOTH EYES: ICD-10-CM

## 2025-04-08 DIAGNOSIS — Z23 NEED FOR TETANUS, DIPHTHERIA, AND ACELLULAR PERTUSSIS (TDAP) VACCINE: ICD-10-CM

## 2025-04-08 DIAGNOSIS — Z00.00 WELL WOMAN EXAM (NO GYNECOLOGICAL EXAM): Primary | ICD-10-CM

## 2025-04-08 PROBLEM — U07.1 COVID-19: Status: RESOLVED | Noted: 2021-01-13 | Resolved: 2025-04-08

## 2025-04-08 PROBLEM — E05.00 GRAVES DISEASE: Status: RESOLVED | Noted: 2024-08-23 | Resolved: 2025-04-08

## 2025-04-08 LAB
ALBUMIN SERPL-MCNC: 3.9 G/DL (ref 3.4–5)
ALBUMIN/GLOB SERPL: 0.9 (ref 0.8–1.7)
ALP SERPL-CCNC: 127 U/L (ref 45–117)
ALT SERPL-CCNC: 24 U/L (ref 13–56)
ANION GAP SERPL CALC-SCNC: 4 MMOL/L (ref 3–18)
AST SERPL-CCNC: 20 U/L (ref 10–38)
BILIRUB SERPL-MCNC: 0.7 MG/DL (ref 0.2–1)
BUN SERPL-MCNC: 13 MG/DL (ref 7–18)
BUN/CREAT SERPL: 16 (ref 12–20)
CALCIUM SERPL-MCNC: 9.6 MG/DL (ref 8.5–10.1)
CHLORIDE SERPL-SCNC: 108 MMOL/L (ref 100–111)
CHOLEST SERPL-MCNC: 213 MG/DL
CO2 SERPL-SCNC: 27 MMOL/L (ref 21–32)
CREAT SERPL-MCNC: 0.81 MG/DL (ref 0.6–1.3)
ERYTHROCYTE [DISTWIDTH] IN BLOOD BY AUTOMATED COUNT: 14.6 % (ref 11.6–14.5)
EST. AVERAGE GLUCOSE BLD GHB EST-MCNC: 120 MG/DL
GLOBULIN SER CALC-MCNC: 4.2 G/DL (ref 2–4)
GLUCOSE SERPL-MCNC: 102 MG/DL (ref 74–99)
HBA1C MFR BLD: 5.8 % (ref 4.2–5.6)
HCT VFR BLD AUTO: 43.6 % (ref 35–45)
HDLC SERPL-MCNC: 96 MG/DL (ref 40–60)
HDLC SERPL: 2.2 (ref 0–5)
HGB BLD-MCNC: 13.8 G/DL (ref 12–16)
LDLC SERPL CALC-MCNC: 103.8 MG/DL (ref 0–100)
LIPID PANEL: ABNORMAL
MCH RBC QN AUTO: 27.1 PG (ref 24–34)
MCHC RBC AUTO-ENTMCNC: 31.7 G/DL (ref 31–37)
MCV RBC AUTO: 85.7 FL (ref 78–100)
NRBC # BLD: 0 K/UL (ref 0–0.01)
NRBC BLD-RTO: 0 PER 100 WBC
PLATELET # BLD AUTO: 255 K/UL (ref 135–420)
PMV BLD AUTO: 11.4 FL (ref 9.2–11.8)
POTASSIUM SERPL-SCNC: 4.4 MMOL/L (ref 3.5–5.5)
PROT SERPL-MCNC: 8.1 G/DL (ref 6.4–8.2)
RBC # BLD AUTO: 5.09 M/UL (ref 4.2–5.3)
SODIUM SERPL-SCNC: 139 MMOL/L (ref 136–145)
TRIGL SERPL-MCNC: 66 MG/DL
TSH SERPL DL<=0.05 MIU/L-ACNC: 6.21 UIU/ML (ref 0.36–3.74)
VLDLC SERPL CALC-MCNC: 13.2 MG/DL
WBC # BLD AUTO: 5.8 K/UL (ref 4.6–13.2)

## 2025-04-08 PROCEDURE — 84443 ASSAY THYROID STIM HORMONE: CPT

## 2025-04-08 PROCEDURE — 99396 PREV VISIT EST AGE 40-64: CPT | Performed by: INTERNAL MEDICINE

## 2025-04-08 PROCEDURE — 90715 TDAP VACCINE 7 YRS/> IM: CPT | Performed by: INTERNAL MEDICINE

## 2025-04-08 PROCEDURE — 90471 IMMUNIZATION ADMIN: CPT | Performed by: INTERNAL MEDICINE

## 2025-04-08 PROCEDURE — 85027 COMPLETE CBC AUTOMATED: CPT

## 2025-04-08 PROCEDURE — 83036 HEMOGLOBIN GLYCOSYLATED A1C: CPT

## 2025-04-08 PROCEDURE — 80053 COMPREHEN METABOLIC PANEL: CPT

## 2025-04-08 PROCEDURE — 36415 COLL VENOUS BLD VENIPUNCTURE: CPT

## 2025-04-08 PROCEDURE — 80061 LIPID PANEL: CPT

## 2025-04-08 RX ORDER — AZELASTINE HYDROCHLORIDE 0.5 MG/ML
1 SOLUTION/ DROPS OPHTHALMIC 2 TIMES DAILY
Qty: 18 ML | Refills: 1 | Status: SHIPPED | OUTPATIENT
Start: 2025-04-08 | End: 2025-07-07

## 2025-04-08 RX ORDER — FLUTICASONE PROPIONATE 50 MCG
1 SPRAY, SUSPENSION (ML) NASAL DAILY
Qty: 16 G | Refills: 3 | Status: SHIPPED | OUTPATIENT
Start: 2025-04-08

## 2025-04-08 SDOH — ECONOMIC STABILITY: FOOD INSECURITY: WITHIN THE PAST 12 MONTHS, THE FOOD YOU BOUGHT JUST DIDN'T LAST AND YOU DIDN'T HAVE MONEY TO GET MORE.: NEVER TRUE

## 2025-04-08 SDOH — ECONOMIC STABILITY: FOOD INSECURITY: WITHIN THE PAST 12 MONTHS, YOU WORRIED THAT YOUR FOOD WOULD RUN OUT BEFORE YOU GOT MONEY TO BUY MORE.: NEVER TRUE

## 2025-04-08 ASSESSMENT — PATIENT HEALTH QUESTIONNAIRE - PHQ9
SUM OF ALL RESPONSES TO PHQ QUESTIONS 1-9: 0
1. LITTLE INTEREST OR PLEASURE IN DOING THINGS: NOT AT ALL
SUM OF ALL RESPONSES TO PHQ QUESTIONS 1-9: 0
2. FEELING DOWN, DEPRESSED OR HOPELESS: NOT AT ALL
SUM OF ALL RESPONSES TO PHQ QUESTIONS 1-9: 0
SUM OF ALL RESPONSES TO PHQ QUESTIONS 1-9: 0

## 2025-04-08 NOTE — PROGRESS NOTES
Assessment/ Plan:   Brenda was seen today for annual exam.    Diagnoses and all orders for this visit:    Well woman exam (no gynecological exam)-Advised re: monthly self breast exam, dental prophylaxis Q 6 months, regular exercise, yearly eye exam, daily intake of Ca+D  Mammo up to date  Colonoscopy due 5/2026  PAP and breast exam c/o her Gyn  -     CBC; Future  -     Comprehensive Metabolic Panel; Future  -     Hemoglobin A1C; Future  -     Lipid Panel; Future  -     TSH; Future  Discuss other vaccines at future visit-Shingrix      History of Graves' disease-s/p total thyroidectomy    Prediabetes-will need to review endo notes as she was started on Jardiance after Metformin gave her stomach upset  -     Hemoglobin A1C; Future    Post-surgical hypothyroidism-Synthroid dosed by Endo  -     TSH; Future    Allergic conjunctivitis of both eyes-will try Azelastine in place of Pataday which does not always work for her eye itching during allergy season  -     azelastine (OPTIVAR) 0.05 % ophthalmic solution; Place 1 drop into both eyes 2 times daily    Allergic rhinitis, unspecified seasonality, unspecified trigger  -     fluticasone (FLONASE) 50 MCG/ACT nasal spray; 1 spray by Nasal route daily    Need for tetanus, diphtheria, and acellular pertussis (Tdap) vaccine  -     Tdap, BOOSTRIX, (age 10 yrs+), IM        Follow-up and Dispositions    Return in about 1 year (around 4/8/2026) for physical.                 Chief Complaint   Patient presents with    Annual Exam       Pt is a 60 y.o. year old female who presents for follow up of her chronic medical problems/annual exam    Health Maintenance Due   Topic Date Due    HIV screen  Never done    Hepatitis B vaccine (1 of 3 - 19+ 3-dose series) Never done    Pneumococcal 50+ years Vaccine (1 of 1 - PCV) Never done    Shingles vaccine (2 of 2) 06/03/2024    COVID-19 Vaccine (4 - 2024-25 season) 09/01/2024    DTaP/Tdap/Td vaccine (2 - Td or Tdap)-today 03/03/2025    A1C

## 2025-04-09 ENCOUNTER — RESULTS FOLLOW-UP (OUTPATIENT)
Facility: CLINIC | Age: 60
End: 2025-04-09